# Patient Record
Sex: MALE | Race: WHITE | NOT HISPANIC OR LATINO | Employment: OTHER | URBAN - METROPOLITAN AREA
[De-identification: names, ages, dates, MRNs, and addresses within clinical notes are randomized per-mention and may not be internally consistent; named-entity substitution may affect disease eponyms.]

---

## 2017-11-21 ENCOUNTER — TRANSCRIBE ORDERS (OUTPATIENT)
Dept: ADMINISTRATIVE | Facility: HOSPITAL | Age: 57
End: 2017-11-21

## 2017-11-21 DIAGNOSIS — R10.2 PELVIC PAIN: Primary | ICD-10-CM

## 2017-11-29 ENCOUNTER — HOSPITAL ENCOUNTER (OUTPATIENT)
Dept: MRI IMAGING | Facility: HOSPITAL | Age: 57
Discharge: HOME | End: 2017-11-29
Attending: SPECIALIST | Admitting: SPECIALIST

## 2017-11-29 DIAGNOSIS — R10.2 PELVIC PAIN: ICD-10-CM

## 2017-11-29 PROCEDURE — 72195 MRI PELVIS W/O DYE: CPT

## 2017-12-04 ENCOUNTER — HOSPITAL ENCOUNTER (OUTPATIENT)
Facility: HOSPITAL | Age: 57
Setting detail: OBSERVATION
Discharge: HOME | End: 2017-12-07
Attending: EMERGENCY MEDICINE | Admitting: HOSPITALIST

## 2017-12-04 DIAGNOSIS — I82.491 ACUTE DEEP VEIN THROMBOSIS (DVT) OF OTHER SPECIFIED VEIN OF RIGHT LOWER EXTREMITY (HCC): Primary | ICD-10-CM

## 2017-12-04 PROBLEM — I82.409 DEEP VENOUS THROMBOSIS (HCC): Status: ACTIVE | Noted: 2017-12-04

## 2017-12-04 LAB
ALBUMIN SERPL-MCNC: 4.6 G/DL (ref 3.5–5.2)
ALBUMIN/GLOB SERPL: 1.4 G/DL
ALP SERPL-CCNC: 67 U/L (ref 39–117)
ALT SERPL W P-5'-P-CCNC: 22 U/L (ref 1–41)
ANION GAP SERPL CALCULATED.3IONS-SCNC: 12.7 MMOL/L
APTT PPP: 28.7 SECONDS (ref 22.7–35.4)
AST SERPL-CCNC: 20 U/L (ref 1–40)
BASOPHILS # BLD AUTO: 0.05 10*3/MM3 (ref 0–0.2)
BASOPHILS NFR BLD AUTO: 0.4 % (ref 0–1.5)
BILIRUB SERPL-MCNC: 0.5 MG/DL (ref 0.1–1.2)
BUN BLD-MCNC: 8 MG/DL (ref 6–20)
BUN/CREAT SERPL: 13.1 (ref 7–25)
CALCIUM SPEC-SCNC: 9.9 MG/DL (ref 8.6–10.5)
CHLORIDE SERPL-SCNC: 97 MMOL/L (ref 98–107)
CO2 SERPL-SCNC: 28.3 MMOL/L (ref 22–29)
CREAT BLD-MCNC: 0.61 MG/DL (ref 0.76–1.27)
DEPRECATED RDW RBC AUTO: 41.9 FL (ref 37–54)
EOSINOPHIL # BLD AUTO: 0.31 10*3/MM3 (ref 0–0.7)
EOSINOPHIL NFR BLD AUTO: 2.5 % (ref 0.3–6.2)
ERYTHROCYTE [DISTWIDTH] IN BLOOD BY AUTOMATED COUNT: 12.4 % (ref 11.5–14.5)
GFR SERPL CREATININE-BSD FRML MDRD: 137 ML/MIN/1.73
GLOBULIN UR ELPH-MCNC: 3.2 GM/DL
GLUCOSE BLD-MCNC: 95 MG/DL (ref 65–99)
HCT VFR BLD AUTO: 43.6 % (ref 40.4–52.2)
HGB BLD-MCNC: 14.9 G/DL (ref 13.7–17.6)
IMM GRANULOCYTES # BLD: 0.04 10*3/MM3 (ref 0–0.03)
IMM GRANULOCYTES NFR BLD: 0.3 % (ref 0–0.5)
INR PPP: 1.06 (ref 0.9–1.1)
LYMPHOCYTES # BLD AUTO: 2.08 10*3/MM3 (ref 0.9–4.8)
LYMPHOCYTES NFR BLD AUTO: 16.8 % (ref 19.6–45.3)
MCH RBC QN AUTO: 31.4 PG (ref 27–32.7)
MCHC RBC AUTO-ENTMCNC: 34.2 G/DL (ref 32.6–36.4)
MCV RBC AUTO: 92 FL (ref 79.8–96.2)
MONOCYTES # BLD AUTO: 0.78 10*3/MM3 (ref 0.2–1.2)
MONOCYTES NFR BLD AUTO: 6.3 % (ref 5–12)
NEUTROPHILS # BLD AUTO: 9.12 10*3/MM3 (ref 1.9–8.1)
NEUTROPHILS NFR BLD AUTO: 73.7 % (ref 42.7–76)
PLATELET # BLD AUTO: 199 10*3/MM3 (ref 140–500)
PMV BLD AUTO: 10.5 FL (ref 6–12)
POTASSIUM BLD-SCNC: 4.2 MMOL/L (ref 3.5–5.2)
PROT SERPL-MCNC: 7.8 G/DL (ref 6–8.5)
PROTHROMBIN TIME: 13.4 SECONDS (ref 11.7–14.2)
RBC # BLD AUTO: 4.74 10*6/MM3 (ref 4.6–6)
SODIUM BLD-SCNC: 138 MMOL/L (ref 136–145)
WBC NRBC COR # BLD: 12.38 10*3/MM3 (ref 4.5–10.7)

## 2017-12-04 PROCEDURE — 96365 THER/PROPH/DIAG IV INF INIT: CPT

## 2017-12-04 PROCEDURE — 36415 COLL VENOUS BLD VENIPUNCTURE: CPT

## 2017-12-04 PROCEDURE — G0378 HOSPITAL OBSERVATION PER HR: HCPCS

## 2017-12-04 PROCEDURE — 80053 COMPREHEN METABOLIC PANEL: CPT | Performed by: PHYSICIAN ASSISTANT

## 2017-12-04 PROCEDURE — 99284 EMERGENCY DEPT VISIT MOD MDM: CPT

## 2017-12-04 PROCEDURE — 96376 TX/PRO/DX INJ SAME DRUG ADON: CPT

## 2017-12-04 PROCEDURE — 85025 COMPLETE CBC W/AUTO DIFF WBC: CPT | Performed by: PHYSICIAN ASSISTANT

## 2017-12-04 PROCEDURE — 85730 THROMBOPLASTIN TIME PARTIAL: CPT | Performed by: PHYSICIAN ASSISTANT

## 2017-12-04 PROCEDURE — 25010000002 HEPARIN (PORCINE) PER 1000 UNITS: Performed by: PHYSICIAN ASSISTANT

## 2017-12-04 PROCEDURE — 85610 PROTHROMBIN TIME: CPT | Performed by: PHYSICIAN ASSISTANT

## 2017-12-04 PROCEDURE — 96366 THER/PROPH/DIAG IV INF ADDON: CPT

## 2017-12-04 RX ORDER — HEPARIN SODIUM 5000 [USP'U]/ML
40-80 INJECTION, SOLUTION INTRAVENOUS; SUBCUTANEOUS EVERY 6 HOURS PRN
Status: DISCONTINUED | OUTPATIENT
Start: 2017-12-04 | End: 2017-12-05

## 2017-12-04 RX ORDER — HEPARIN SODIUM 5000 [USP'U]/ML
80 INJECTION, SOLUTION INTRAVENOUS; SUBCUTANEOUS ONCE
Status: COMPLETED | OUTPATIENT
Start: 2017-12-04 | End: 2017-12-04

## 2017-12-04 RX ORDER — CALCIUM POLYCARBOPHIL 625 MG 625 MG/1
625 TABLET ORAL DAILY
Status: ON HOLD | COMMUNITY
End: 2017-12-05

## 2017-12-04 RX ORDER — ASPIRIN 81 MG/1
81 TABLET ORAL DAILY
COMMUNITY
End: 2017-12-07 | Stop reason: HOSPADM

## 2017-12-04 RX ORDER — CALCIUM POLYCARBOPHIL 625 MG 625 MG/1
625 TABLET ORAL DAILY
Status: DISCONTINUED | OUTPATIENT
Start: 2017-12-05 | End: 2017-12-07 | Stop reason: HOSPADM

## 2017-12-04 RX ORDER — PANTOPRAZOLE SODIUM 40 MG/1
40 TABLET, DELAYED RELEASE ORAL
Status: DISCONTINUED | OUTPATIENT
Start: 2017-12-05 | End: 2017-12-07 | Stop reason: HOSPADM

## 2017-12-04 RX ORDER — NIACIN 1000 MG/1
1000 TABLET, EXTENDED RELEASE ORAL NIGHTLY
COMMUNITY
End: 2017-12-04 | Stop reason: SDUPTHER

## 2017-12-04 RX ORDER — LISINOPRIL 20 MG/1
20 TABLET ORAL
Status: DISCONTINUED | OUTPATIENT
Start: 2017-12-05 | End: 2017-12-07 | Stop reason: HOSPADM

## 2017-12-04 RX ORDER — LISINOPRIL AND HYDROCHLOROTHIAZIDE 20; 12.5 MG/1; MG/1
1 TABLET ORAL DAILY
Status: ON HOLD | COMMUNITY
End: 2017-12-05

## 2017-12-04 RX ORDER — HYDROCHLOROTHIAZIDE 25 MG/1
12.5 TABLET ORAL DAILY
Status: DISCONTINUED | OUTPATIENT
Start: 2017-12-05 | End: 2017-12-05

## 2017-12-04 RX ORDER — NIACIN 500 MG/1
1000 TABLET, EXTENDED RELEASE ORAL DAILY
Status: ON HOLD | COMMUNITY
End: 2017-12-05

## 2017-12-04 RX ORDER — CHLORAL HYDRATE 500 MG
1000 CAPSULE ORAL
Status: ON HOLD | COMMUNITY
End: 2017-12-05

## 2017-12-04 RX ORDER — EZETIMIBE 10 MG/1
10 TABLET ORAL DAILY
Status: ON HOLD | COMMUNITY
End: 2017-12-05

## 2017-12-04 RX ADMIN — HEPARIN SODIUM 7800 UNITS: 5000 INJECTION, SOLUTION INTRAVENOUS; SUBCUTANEOUS at 18:17

## 2017-12-04 RX ADMIN — HEPARIN SODIUM 1500 UNITS/HR: 10000 INJECTION, SOLUTION INTRAVENOUS at 18:20

## 2017-12-04 NOTE — ED NOTES
Patient has known blood clot to right femoral, popliteal and posterior tibial veins. He has a doppler at [facility] today and was sent straight over to the ED. Patient has palpable pulse to the right foot with cap refill less than 3 ans foot feels cool to touch.      Rashida Hernandez RN  12/04/17 1544       Rashida Hernandez RN  12/04/17 1542

## 2017-12-04 NOTE — ED PROVIDER NOTES
Pt presents to ED complaining of right calf pain. He has had a known hamstring tear for 2 weeks, but this pain is acutely different. An outpatient doppler done earlier today showed a DVT, and pt was sent here to be admitted. Pt complains of pain, swelling, and bruising in his right calf. He denies chest pain, SOA, or any other sx at this time. Pt has never had a blood clot before. On my exam, there is old bruising in right calf, just superior to popliteal area.  NVI. No motor or sensory changes.    1740: Spoke with Dr. Yepez, who requests that I admit pt to [facility]. Pt will be started on Heparin drip.     I supervised care provided by the midlevel provider.    We have discussed this patient's history, physical exam, and treatment plan.   I have reviewed the note and personally saw and examined the patient and agree with the plan of care.    Documentation assistance provided by jonathan Vaughan for Tucker Rodrigues.  Information recorded by the scribe was done at my direction and has been verified and validated by me.       Mahi Vaughan  12/04/17 4767       Tucker Rodrigues MD  12/04/17 0703

## 2017-12-04 NOTE — ED PROVIDER NOTES
"EMERGENCY DEPARTMENT ENCOUNTER    CHIEF COMPLAINT  Chief Complaint: R calf pain and swelling  History given by:Pt  History limited by:Nothing  Room Number: 11/11  PMD: Nima Morton MD      HPI:  Pt is a 57 y.o. male who presents with R calf pain and swelling onset a few days ago. Pt reports he was sleeping on a tarp a week ago and heard something \"tear.\" Pt reports he waited 4-5 days after sustaining the injury to his R leg to go see Dr. Yepez (orthopedist). The day before he saw his orthopedist, he developed pain and swelling to his R calf. When he saw Dr. Yepez, he was informed he tore his hamstring. Pt also reports that when he was seen by Dr. Yepez, they recommended he have an US to rule out a DVT. He reports going to [facility] for an US today who then told him he had a DVT. PT denies any current CP or SOA.     Duration: a few days  Timing:constant  Location:RLE  Radiation:none  Quality:pain and swelling  Intensity/Severity:moderate  Progression:worsening  Associated Symptoms:none stated  Aggravating Factors:none  Alleviating Factors:none  Previous Episodes:Pt did not state a prior hx of blood clots  Treatment before arrival:Pt was seen at [facility] today for an US, and was told he had a DVT.     MEDICAL RECORD REVIEW  Pt has a hx of HTN. He does not have any listed history in [place] because he is from [place]. Pt had a Doppler US at [facility] today and was found to have R femoral, popliteal, and posterior tibial DVT. He was sent here to be admitted.       PAST MEDICAL HISTORY  Active Ambulatory Problems     Diagnosis Date Noted   • No Active Ambulatory Problems     Resolved Ambulatory Problems     Diagnosis Date Noted   • No Resolved Ambulatory Problems     Past Medical History:   Diagnosis Date   • DVT (deep venous thrombosis)    • Heart murmur    • Hyperlipidemia    • Hypertension        PAST SURGICAL HISTORY  Past Surgical History:   Procedure Laterality Date   • ADENOIDECTOMY     • KNEE " SURGERY     • SHOULDER SURGERY     • TONSILLECTOMY         FAMILY HISTORY  History reviewed. No pertinent family history.    SOCIAL HISTORY  Social History     Social History   • Marital status:      Spouse name: N/A   • Number of children: N/A   • Years of education: N/A     Occupational History   • Not on file.     Social History Main Topics   • Smoking status: Never Smoker   • Smokeless tobacco: Never Used   • Alcohol use Yes      Comment: socially   • Drug use: No   • Sexual activity: Defer     Other Topics Concern   • Not on file     Social History Narrative   • No narrative on file       ALLERGIES  Review of patient's allergies indicates no known allergies.    REVIEW OF SYSTEMS  Review of Systems   Constitutional: Negative for activity change, appetite change and fever.   HENT: Negative for congestion and sore throat.    Eyes: Negative.    Respiratory: Negative for cough and shortness of breath.    Cardiovascular: Negative for chest pain.   Gastrointestinal: Negative for abdominal pain, diarrhea and vomiting.   Endocrine: Negative.    Genitourinary: Negative for decreased urine volume and dysuria.   Musculoskeletal: Positive for myalgias (R calf). Negative for neck pain.   Skin: Negative for rash and wound.   Allergic/Immunologic: Negative.    Neurological: Negative for weakness, numbness and headaches.   Hematological: Negative.    Psychiatric/Behavioral: Negative.    All other systems reviewed and are negative.      PHYSICAL EXAM  ED Triage Vitals   Temp Heart Rate Resp BP SpO2   12/04/17 1522 12/04/17 1522 12/04/17 1522 12/04/17 1546 12/04/17 1522   99 °F (37.2 °C) 84 18 143/95 99 %      Temp src Heart Rate Source Patient Position BP Location FiO2 (%)   -- -- -- -- --              Physical Exam   Constitutional: He is oriented to person, place, and time and well-developed, well-nourished, and in no distress. No distress.   HENT:   Head: Normocephalic and atraumatic.   Mouth/Throat: Oropharynx is  clear and moist.   Eyes: EOM are normal. Pupils are equal, round, and reactive to light.   Neck: Normal range of motion. Neck supple.   Cardiovascular: Normal rate, regular rhythm and normal heart sounds.    Pulses:       Dorsalis pedis pulses are 2+ on the right side, and 2+ on the left side.        Posterior tibial pulses are 2+ on the right side, and 2+ on the left side.   Pulmonary/Chest: Effort normal and breath sounds normal. No respiratory distress. He has no wheezes. He exhibits no tenderness.   Abdominal: Soft. He exhibits no distension. There is no tenderness. There is no rebound and no guarding.   Musculoskeletal:        Right lower leg: He exhibits tenderness (medial calf).   Lymphadenopathy:     He has no cervical adenopathy.   Neurological: He is alert and oriented to person, place, and time.   Skin: Skin is warm and dry. No rash noted. No pallor.   There is no obvious swelling, erythema or warmth to the medial calf. There is yellow to purple bruising on the R distal medial thigh that tracks up through his thigh.    Psychiatric: Mood, memory, affect and judgment normal.   Nursing note and vitals reviewed.      LAB RESULTS  Recent Results (from the past 24 hour(s))   Protime-INR    Collection Time: 12/04/17  5:42 PM   Result Value Ref Range    Protime 13.4 11.7 - 14.2 Seconds    INR 1.06 0.90 - 1.10   aPTT    Collection Time: 12/04/17  5:42 PM   Result Value Ref Range    PTT 28.7 22.7 - 35.4 seconds   CBC Auto Differential    Collection Time: 12/04/17  5:42 PM   Result Value Ref Range    WBC 12.38 (H) 4.50 - 10.70 10*3/mm3    RBC 4.74 4.60 - 6.00 10*6/mm3    Hemoglobin 14.9 13.7 - 17.6 g/dL    Hematocrit 43.6 40.4 - 52.2 %    MCV 92.0 79.8 - 96.2 fL    MCH 31.4 27.0 - 32.7 pg    MCHC 34.2 32.6 - 36.4 g/dL    RDW 12.4 11.5 - 14.5 %    RDW-SD 41.9 37.0 - 54.0 fl    MPV 10.5 6.0 - 12.0 fL    Platelets 199 140 - 500 10*3/mm3    Neutrophil % 73.7 42.7 - 76.0 %    Lymphocyte % 16.8 (L) 19.6 - 45.3 %     Monocyte % 6.3 5.0 - 12.0 %    Eosinophil % 2.5 0.3 - 6.2 %    Basophil % 0.4 0.0 - 1.5 %    Immature Grans % 0.3 0.0 - 0.5 %    Neutrophils, Absolute 9.12 (H) 1.90 - 8.10 10*3/mm3    Lymphocytes, Absolute 2.08 0.90 - 4.80 10*3/mm3    Monocytes, Absolute 0.78 0.20 - 1.20 10*3/mm3    Eosinophils, Absolute 0.31 0.00 - 0.70 10*3/mm3    Basophils, Absolute 0.05 0.00 - 0.20 10*3/mm3    Immature Grans, Absolute 0.04 (H) 0.00 - 0.03 10*3/mm3       I ordered the above labs and reviewed the results    COURSE & MEDICAL DECISION MAKING  Pertinent Labs and Imaging studies that were ordered and reviewed are noted above.  Results were reviewed/discussed with the patient and they were also made aware of online assess.  Pt also made aware that some labs, such as cultures, will not be resulted during ER visit and follow up with PMD is necessary.       PROGRESS AND CONSULTS    Progress Notes:    ED Course     1718  Reviewed pt's history and workup with Dr. Rodrigues.  After a bedside evaluation; Dr Rodrigues agrees with the plan of care    1740  Dr. Rodrigues consulted with Dr. Yepez (orthopedist) who states she would like the pt to admitted, and recommends he be heparinized.     1741  Consulted with Zulma (Pharmacy) who reports she will initiate heparin protocol. Consult placed with GERDA.     1803  Consulted with Dr. Brito ([facility]) who agrees to admit the pt    1804  Based on the patient's lab findings and presenting symptoms, the doctor and I feel it is appropriate to admit the patient for further management, evaluation, and treatment.  I have discussed this with the admitting team.  I have also discussed this with the patient/family.  They are in agreement with admission.        MEDICATIONS GIVEN IN ER  Medications   heparin (porcine) 5000 UNIT/ML injection 7,800 Units (not administered)   heparin 34919 units/250 mL (100 units/mL) in 0.45 % NaCl infusion (not administered)   heparin (porcine) 5000 UNIT/ML injection 3,900-7,800  "Units (not administered)       /91  Pulse 78  Temp 99 °F (37.2 °C)  Resp 18  Ht 67\" (170.2 cm)  Wt 215 lb (97.5 kg)  SpO2 97%  BMI 33.67 kg/m2      DIAGNOSIS  Final diagnoses:   Acute deep vein thrombosis (DVT) of other specified vein of right lower extremity       Documentation assistance provided by jonathan Baum for Denise Mills PA-C.  Information recorded by the scribe was done at my direction and has been verified and validated by me.  Electronically signed by Ismael Baum on 12/4/2017 at time 6:13 PM           Ismael Baum  12/04/17 1812       Denise Mills PA-C  12/04/17 1813    "

## 2017-12-04 NOTE — PROGRESS NOTES
Clinical Pharmacy Services: Medication History    Henry Campos is a 57 y.o. male presenting to [facility] for   Chief Complaint   Patient presents with   • Leg Swelling     right reg DVT diagnosed outpatient, sent to be admitted. Hx torn hamstring.  swelling, pain, and bruising.        He  has a past medical history of DVT (deep venous thrombosis); Heart murmur; Hyperlipidemia; and Hypertension.    Allergies as of 12/04/2017   • (No Known Allergies)       Medication information was obtained from: Patient, spouse  Pharmacy and Phone Number: [facility] [phone]    Prior to Admission Medications     Prescriptions Last Dose Informant Patient Reported? Taking?    aspirin 81 MG EC tablet 12/3/2017 Spouse/Significant Other Yes Yes    Take 81 mg by mouth Daily.    calcium polycarbophil (FIBERCON) 625 MG tablet 12/3/2017 Spouse/Significant Other Yes Yes    Take 625 mg by mouth Daily.    ezetimibe (ZETIA) 10 MG tablet 12/3/2017 Spouse/Significant Other Yes Yes    Take 10 mg by mouth Daily.    lisinopril-hydrochlorothiazide (PRINZIDE,ZESTORETIC) 20-12.5 MG per tablet 12/3/2017 Spouse/Significant Other Yes Yes    Take 1 tablet by mouth Daily.    niacin (NIASPAN) 500 MG CR tablet 12/3/2017 Spouse/Significant Other Yes Yes    Take 1,000 mg by mouth Daily.    Omega-3 Fatty Acids (FISH OIL) 1000 MG capsule capsule 12/3/2017 Spouse/Significant Other Yes Yes    Take 1,000 mg by mouth Daily With Breakfast.            Medication notes: None    This medication list is complete to the best of my knowledge as of 12/4/2017    Please call if questions.    Eboni Adkins, Medication History Technician  12/4/2017 6:03 PM

## 2017-12-05 LAB
ALBUMIN SERPL-MCNC: 4.3 G/DL (ref 3.5–5.2)
ALBUMIN/GLOB SERPL: 1.5 G/DL
ALP SERPL-CCNC: 66 U/L (ref 39–117)
ALT SERPL W P-5'-P-CCNC: 17 U/L (ref 1–41)
ANION GAP SERPL CALCULATED.3IONS-SCNC: 11.8 MMOL/L
APTT PPP: 91 SECONDS (ref 22.7–35.4)
APTT PPP: 94.5 SECONDS (ref 22.7–35.4)
AST SERPL-CCNC: 15 U/L (ref 1–40)
BASOPHILS # BLD AUTO: 0.06 10*3/MM3 (ref 0–0.2)
BASOPHILS NFR BLD AUTO: 0.6 % (ref 0–1.5)
BILIRUB SERPL-MCNC: 0.6 MG/DL (ref 0.1–1.2)
BUN BLD-MCNC: 10 MG/DL (ref 6–20)
BUN/CREAT SERPL: 14.7 (ref 7–25)
CALCIUM SPEC-SCNC: 9.3 MG/DL (ref 8.6–10.5)
CHLORIDE SERPL-SCNC: 98 MMOL/L (ref 98–107)
CO2 SERPL-SCNC: 28.2 MMOL/L (ref 22–29)
CREAT BLD-MCNC: 0.68 MG/DL (ref 0.76–1.27)
DEPRECATED RDW RBC AUTO: 42.1 FL (ref 37–54)
EOSINOPHIL # BLD AUTO: 0.41 10*3/MM3 (ref 0–0.7)
EOSINOPHIL NFR BLD AUTO: 4.1 % (ref 0.3–6.2)
ERYTHROCYTE [DISTWIDTH] IN BLOOD BY AUTOMATED COUNT: 12.4 % (ref 11.5–14.5)
GFR SERPL CREATININE-BSD FRML MDRD: 121 ML/MIN/1.73
GLOBULIN UR ELPH-MCNC: 2.9 GM/DL
GLUCOSE BLD-MCNC: 95 MG/DL (ref 65–99)
HCT VFR BLD AUTO: 41.1 % (ref 40.4–52.2)
HGB BLD-MCNC: 13.9 G/DL (ref 13.7–17.6)
IMM GRANULOCYTES # BLD: 0.02 10*3/MM3 (ref 0–0.03)
IMM GRANULOCYTES NFR BLD: 0.2 % (ref 0–0.5)
LYMPHOCYTES # BLD AUTO: 2.52 10*3/MM3 (ref 0.9–4.8)
LYMPHOCYTES NFR BLD AUTO: 25.1 % (ref 19.6–45.3)
MCH RBC QN AUTO: 31.4 PG (ref 27–32.7)
MCHC RBC AUTO-ENTMCNC: 33.8 G/DL (ref 32.6–36.4)
MCV RBC AUTO: 92.8 FL (ref 79.8–96.2)
MONOCYTES # BLD AUTO: 0.67 10*3/MM3 (ref 0.2–1.2)
MONOCYTES NFR BLD AUTO: 6.7 % (ref 5–12)
NEUTROPHILS # BLD AUTO: 6.34 10*3/MM3 (ref 1.9–8.1)
NEUTROPHILS NFR BLD AUTO: 63.3 % (ref 42.7–76)
PLATELET # BLD AUTO: 204 10*3/MM3 (ref 140–500)
PMV BLD AUTO: 10.5 FL (ref 6–12)
POTASSIUM BLD-SCNC: 4.1 MMOL/L (ref 3.5–5.2)
PROT SERPL-MCNC: 7.2 G/DL (ref 6–8.5)
RBC # BLD AUTO: 4.43 10*6/MM3 (ref 4.6–6)
SODIUM BLD-SCNC: 138 MMOL/L (ref 136–145)
WBC NRBC COR # BLD: 10.02 10*3/MM3 (ref 4.5–10.7)

## 2017-12-05 PROCEDURE — 80053 COMPREHEN METABOLIC PANEL: CPT | Performed by: HOSPITALIST

## 2017-12-05 PROCEDURE — 36415 COLL VENOUS BLD VENIPUNCTURE: CPT | Performed by: PHYSICIAN ASSISTANT

## 2017-12-05 PROCEDURE — 85730 THROMBOPLASTIN TIME PARTIAL: CPT | Performed by: PHYSICIAN ASSISTANT

## 2017-12-05 PROCEDURE — 96372 THER/PROPH/DIAG INJ SC/IM: CPT

## 2017-12-05 PROCEDURE — G0378 HOSPITAL OBSERVATION PER HR: HCPCS

## 2017-12-05 PROCEDURE — 85730 THROMBOPLASTIN TIME PARTIAL: CPT | Performed by: EMERGENCY MEDICINE

## 2017-12-05 PROCEDURE — 25010000002 ENOXAPARIN PER 10 MG: Performed by: HOSPITALIST

## 2017-12-05 PROCEDURE — 25010000002 HEPARIN (PORCINE) PER 1000 UNITS: Performed by: PHYSICIAN ASSISTANT

## 2017-12-05 PROCEDURE — 85025 COMPLETE CBC W/AUTO DIFF WBC: CPT | Performed by: PHYSICIAN ASSISTANT

## 2017-12-05 PROCEDURE — 96366 THER/PROPH/DIAG IV INF ADDON: CPT

## 2017-12-05 RX ORDER — HEPARIN SODIUM 5000 [USP'U]/ML
80 INJECTION, SOLUTION INTRAVENOUS; SUBCUTANEOUS ONCE
Status: DISCONTINUED | OUTPATIENT
Start: 2017-12-05 | End: 2017-12-05

## 2017-12-05 RX ORDER — ASPIRIN 81 MG/1
81 TABLET ORAL DAILY
Status: DISCONTINUED | OUTPATIENT
Start: 2017-12-05 | End: 2017-12-06

## 2017-12-05 RX ADMIN — ENOXAPARIN SODIUM 100 MG: 100 INJECTION SUBCUTANEOUS at 15:22

## 2017-12-05 RX ADMIN — CALCIUM POLYCARBOPHIL 625 MG: 625 TABLET, FILM COATED ORAL at 08:16

## 2017-12-05 RX ADMIN — ASPIRIN 81 MG: 81 TABLET ORAL at 15:21

## 2017-12-05 RX ADMIN — LISINOPRIL 20 MG: 20 TABLET ORAL at 08:15

## 2017-12-05 RX ADMIN — HEPARIN SODIUM 1500 UNITS/HR: 10000 INJECTION, SOLUTION INTRAVENOUS at 12:14

## 2017-12-05 RX ADMIN — HYDROCHLOROTHIAZIDE 12.5 MG: 25 TABLET ORAL at 08:15

## 2017-12-05 NOTE — PROGRESS NOTES
Pharmacy consult for Lovenox dosing    Henry Campos is a 57 y.o. male 97.5 kg (215 lb).    Pharmacy consulted to dose per Dr. Brito  Indication: RLE DVT    Active Inpatient Anticoagulation Orders: Previously on Heparin drip, now Enoxaparin 1mg/kg q12h    Estimated Creatinine Clearance: 135 mL/min (by C-G formula based on Cr of 0.68).  Body mass index is 33.67 kg/(m^2).    Creatinine   Date Value Ref Range Status   12/05/2017 0.68 (L) 0.76 - 1.27 mg/dL Final   12/04/2017 0.61 (L) 0.76 - 1.27 mg/dL Final     Platelets   Date Value Ref Range Status   12/05/2017 204 140 - 500 10*3/mm3 Final   12/04/2017 199 140 - 500 10*3/mm3 Final     Hemoglobin   Date Value Ref Range Status   12/05/2017 13.9 13.7 - 17.6 g/dL Final   12/04/2017 14.9 13.7 - 17.6 g/dL Final     Lab Results   Component Value Date    INR 1.06 12/04/2017     PLAN:  As estimated CrCl > 30 mL/min at this time, will start Lovenox at 1mg/kg q12h (100mg).  Pharmacy will continue to follow and adjust as needed.      Thanks, Henry Jeong, PharmD, BCPS

## 2017-12-05 NOTE — PLAN OF CARE
Problem: Patient Care Overview (Adult)  Goal: Plan of Care Review  Outcome: Ongoing (interventions implemented as appropriate)    12/05/17 0318   Coping/Psychosocial Response Interventions   Plan Of Care Reviewed With patient   Patient Care Overview   Progress no change   Outcome Evaluation   Outcome Summary/Follow up Plan Patient is a new admit to unit. Heparin drip maintained per protocol. Patient up ad rosamaria with assistance with crutches. Wife at bedside; patient rested well throughout the night. Will continue to monitor.          Problem: Fall Risk (Adult)  Goal: Identify Related Risk Factors and Signs and Symptoms  Outcome: Ongoing (interventions implemented as appropriate)  Goal: Absence of Falls  Outcome: Ongoing (interventions implemented as appropriate)    Problem: Pain, Acute (Adult)  Goal: Identify Related Risk Factors and Signs and Symptoms  Outcome: Ongoing (interventions implemented as appropriate)  Goal: Acceptable Pain Control/Comfort Level  Outcome: Ongoing (interventions implemented as appropriate)

## 2017-12-05 NOTE — H&P
"History and physical    Primary care physician  Dr. DE JESUS    Chief complaint  Right calf pain and swelling    History of present illness  Pt is a 57 y.o. male who presents with R calf pain and swelling onset a few days ago. Pt reports he was sleeping on a tarp a week ago and heard something \"tear.\" Pt reports he waited 4-5 days after sustaining the injury to his R leg to go see Dr. Yepez (orthopedist). The day before he saw his orthopedist, he developed pain and swelling to his R calf. When he saw Dr. Yepez, he was informed he tore his hamstring. Pt also reports that when he was seen by Dr. Yepez, they recommended he have an US to rule out a DVT. He reports going to [facility] for an US today who then told him he had a DVT. PT denies any current CP or SOA.  Patient denies any chest pain shortness of breath abdominal pain nausea vomiting diarrhea.  Patient still hurting at the right calf at the time of interview she also denies any fevers chills night sweats or weight loss    PAST MEDICAL HISTORY    • DVT (deep venous thrombosis)     • Heart murmur     • Hyperlipidemia     • Hypertension           PAST SURGICAL HISTORY   Surgical History          Past Surgical History:   Procedure Laterality Date   • ADENOIDECTOMY       • KNEE SURGERY       • SHOULDER SURGERY       • TONSILLECTOMY                FAMILY HISTORY  History reviewed. No pertinent family history.     SOCIAL HISTORY   Social History    Social History            Social History   • Marital status:        Spouse name: N/A   • Number of children: N/A   • Years of education: N/A          Occupational History   • Not on file.              Social History Main Topics    • Smoking status: Never Smoker    • Smokeless tobacco: Never Used    • Alcohol use Yes         Comment: socially    • Drug use: No    • Sexual activity: Defer            Other Topics Concern   • Not on file          Social History Narrative   • No narrative on file    " "        ALLERGIES  Review of patient's allergies indicates no known allergies.    Home medications reviewed     REVIEW OF SYSTEMS  Review of Systems   Constitutional: Negative for activity change, appetite change and fever.   HENT: Negative for congestion and sore throat.    Eyes: Negative.    Respiratory: Negative for cough and shortness of breath.    Cardiovascular: Negative for chest pain.   Gastrointestinal: Negative for abdominal pain, diarrhea and vomiting.   Endocrine: Negative.    Genitourinary: Negative for decreased urine volume and dysuria.   Musculoskeletal: Positive for myalgias (R calf). Negative for neck pain.   Skin: Negative for rash and wound.   Allergic/Immunologic: Negative.    Neurological: Negative for weakness, numbness and headaches.   Hematological: Negative.    Psychiatric/Behavioral: Negative.    All other systems reviewed and are negative.     PHYSICAL EXAM  Blood pressure 127/76, pulse 79, temperature 97.9 °F (36.6 °C), temperature source Oral, resp. rate 18, height 170.2 cm (67\"), weight 97.5 kg (215 lb), SpO2 95 %.    Constitutional: He is oriented to person, place, and time and well-developed, well-nourished, and in no distress. No distress.  Head: Normocephalic and atraumatic.   Mouth/Throat: Oropharynx is clear and moist.   Eyes: EOM are normal. Pupils are equal, round, and reactive to light.   Neck: Normal range of motion. Neck supple.   Cardiovascular: Normal rate, regular rhythm and normal heart sounds.    Pulses:       Dorsalis pedis pulses are 2+ on the right side, and 2+ on the left side.        Posterior tibial pulses are 2+ on the right side, and 2+ on the left side.   Pulmonary/Chest: Effort normal and breath sounds normal. No respiratory distress. He has no wheezes. He exhibits no tenderness.   Abdominal: Soft. He exhibits no distension. There is no tenderness. There is no rebound and no guarding.   Musculoskeletal:        Right lower leg: He exhibits tenderness (medial " calf).   Lymphadenopathy:     He has no cervical adenopathy.   Neurological: He is alert and oriented to person, place, and time.   Skin: Skin is warm and dry. No rash noted. No pallor.   There is no obvious swelling, erythema or warmth to the medial calf. There is yellow to purple bruising on the R distal medial thigh that tracks up through his thigh.    Psychiatric: Mood, memory, affect and judgment normal.     LAB RESULTS  Lab Results (last 24 hours)     Procedure Component Value Units Date/Time    CBC & Differential [[id number]] Collected:  12/04/17 1742    Specimen:  Blood Updated:  12/04/17 1753    Narrative:       The following orders were created for panel order CBC & Differential.  Procedure                               Abnormality         Status                     ---------                               -----------         ------                     CBC Auto Differential[[id number]]         Abnormal            Final result                 Please view results for these tests on the individual orders.    CBC Auto Differential [[id number]]  (Abnormal) Collected:  12/04/17 1742    Specimen:  Blood Updated:  12/04/17 1753     WBC 12.38 (H) 10*3/mm3      RBC 4.74 10*6/mm3      Hemoglobin 14.9 g/dL      Hematocrit 43.6 %      MCV 92.0 fL      MCH 31.4 pg      MCHC 34.2 g/dL      RDW 12.4 %      RDW-SD 41.9 fl      MPV 10.5 fL      Platelets 199 10*3/mm3      Neutrophil % 73.7 %      Lymphocyte % 16.8 (L) %      Monocyte % 6.3 %      Eosinophil % 2.5 %      Basophil % 0.4 %      Immature Grans % 0.3 %      Neutrophils, Absolute 9.12 (H) 10*3/mm3      Lymphocytes, Absolute 2.08 10*3/mm3      Monocytes, Absolute 0.78 10*3/mm3      Eosinophils, Absolute 0.31 10*3/mm3      Basophils, Absolute 0.05 10*3/mm3      Immature Grans, Absolute 0.04 (H) 10*3/mm3     Protime-INR [[id number]]  (Normal) Collected:  12/04/17 1742    Specimen:  Blood Updated:  12/04/17 1802     Protime 13.4 Seconds      INR 1.06    aPTT [[id number]]   (Normal) Collected:  12/04/17 1742    Specimen:  Blood Updated:  12/04/17 1802     PTT 28.7 seconds     Comprehensive Metabolic Panel [[id number]]  (Abnormal) Collected:  12/04/17 1830    Specimen:  Blood Updated:  12/04/17 1910     Glucose 95 mg/dL      BUN 8 mg/dL      Creatinine 0.61 (L) mg/dL      Sodium 138 mmol/L      Potassium 4.2 mmol/L      Chloride 97 (L) mmol/L      CO2 28.3 mmol/L      Calcium 9.9 mg/dL      Total Protein 7.8 g/dL      Albumin 4.60 g/dL      ALT (SGPT) 22 U/L      AST (SGOT) 20 U/L      Alkaline Phosphatase 67 U/L      Total Bilirubin 0.5 mg/dL      eGFR Non African Amer 137 mL/min/1.73      Globulin 3.2 gm/dL      A/G Ratio 1.4 g/dL      BUN/Creatinine Ratio 13.1     Anion Gap 12.7 mmol/L     aPTT [[id number]]  (Abnormal) Collected:  12/05/17 0021    Specimen:  Blood Updated:  12/05/17 0106     PTT 94.5 (H) seconds     CBC & Differential [[id number]] Collected:  12/05/17 0617    Specimen:  Blood Updated:  12/05/17 0703    Narrative:       The following orders were created for panel order CBC & Differential.  Procedure                               Abnormality         Status                     ---------                               -----------         ------                     CBC Auto Differential[[id number]]        Abnormal            Final result                 Please view results for these tests on the individual orders.    CBC Auto Differential [[id number]]  (Abnormal) Collected:  12/05/17 0617    Specimen:  Blood Updated:  12/05/17 0703     WBC 10.02 10*3/mm3      RBC 4.43 (L) 10*6/mm3      Hemoglobin 13.9 g/dL      Hematocrit 41.1 %      MCV 92.8 fL      MCH 31.4 pg      MCHC 33.8 g/dL      RDW 12.4 %      RDW-SD 42.1 fl      MPV 10.5 fL      Platelets 204 10*3/mm3      Neutrophil % 63.3 %      Lymphocyte % 25.1 %      Monocyte % 6.7 %      Eosinophil % 4.1 %      Basophil % 0.6 %      Immature Grans % 0.2 %      Neutrophils, Absolute 6.34 10*3/mm3      Lymphocytes, Absolute 2.52  10*3/mm3      Monocytes, Absolute 0.67 10*3/mm3      Eosinophils, Absolute 0.41 10*3/mm3      Basophils, Absolute 0.06 10*3/mm3      Immature Grans, Absolute 0.02 10*3/mm3     aPTT [[id number]]  (Abnormal) Collected:  12/05/17 0617    Specimen:  Blood Updated:  12/05/17 0711     PTT 91.0 (H) seconds     Comprehensive Metabolic Panel [[id number]]  (Abnormal) Collected:  12/05/17 0617    Specimen:  Blood Updated:  12/05/17 0725     Glucose 95 mg/dL      BUN 10 mg/dL      Creatinine 0.68 (L) mg/dL      Sodium 138 mmol/L      Potassium 4.1 mmol/L      Chloride 98 mmol/L      CO2 28.2 mmol/L      Calcium 9.3 mg/dL      Total Protein 7.2 g/dL      Albumin 4.30 g/dL      ALT (SGPT) 17 U/L      AST (SGOT) 15 U/L      Alkaline Phosphatase 66 U/L      Total Bilirubin 0.6 mg/dL      eGFR Non African Amer 121 mL/min/1.73      Globulin 2.9 gm/dL      A/G Ratio 1.5 g/dL      BUN/Creatinine Ratio 14.7     Anion Gap 11.8 mmol/L         Imaging Results (last 24 hours)     ** No results found for the last 24 hours. **          Current Facility-Administered Medications:   •  aspirin EC tablet 81 mg, 81 mg, Oral, Daily, Artur Brito MD  •  calcium polycarbophil (FIBERCON) tablet 625 mg, 625 mg, Oral, Daily, Artur Brito MD, 625 mg at 12/05/17 0816  •  heparin (porcine) 5000 UNIT/ML injection 3,900-7,800 Units, 40-80 Units/kg, Intravenous, Q6H PRN, Denise Mills PA-C  •  heparin 19440 units/250 mL (100 units/mL) in 0.45 % NaCl infusion, 1,500 Units/hr, Intravenous, Titrated, Denise Mills PA-C, Last Rate: 15 mL/hr at 12/05/17 1214, 1,500 Units/hr at 12/05/17 1214  •  lisinopril (PRINIVIL,ZESTRIL) tablet 20 mg, 20 mg, Oral, Q24H, Artur Brito MD, 20 mg at 12/05/17 0815  •  pantoprazole (PROTONIX) EC tablet 40 mg, 40 mg, Oral, Q AM, Artur MD Alisha     ASSESSMENT  Right lower extremity DVT  Right hamstring tear  Hypertension  Hyperlipidemia    PLAN  Admit  [facility]  Orthopedic surgery consult  Continue home medications  Stress  ulcer prophylaxis  Pain management  Follow closely further recommendation according to hospital course    MINE MCKEON MD

## 2017-12-05 NOTE — PLAN OF CARE
Problem: Patient Care Overview (Adult)  Goal: Plan of Care Review  Outcome: Ongoing (interventions implemented as appropriate)    12/05/17 3538   Coping/Psychosocial Response Interventions   Plan Of Care Reviewed With patient   Outcome Evaluation   Outcome Summary/Follow up Plan pt on heparin drip, stopped and transitioned to aspirin and lovenox, denies pain, vss, sr on monitor.       Goal: Adult Individualization and Mutuality  Outcome: Ongoing (interventions implemented as appropriate)  Goal: Discharge Needs Assessment  Outcome: Ongoing (interventions implemented as appropriate)    Problem: Fall Risk (Adult)  Goal: Identify Related Risk Factors and Signs and Symptoms  Outcome: Ongoing (interventions implemented as appropriate)  Goal: Absence of Falls  Outcome: Ongoing (interventions implemented as appropriate)    Problem: Pain, Acute (Adult)  Goal: Identify Related Risk Factors and Signs and Symptoms  Outcome: Ongoing (interventions implemented as appropriate)  Goal: Acceptable Pain Control/Comfort Level  Outcome: Ongoing (interventions implemented as appropriate)

## 2017-12-06 LAB
ALBUMIN SERPL-MCNC: 4.3 G/DL (ref 3.5–5.2)
ALBUMIN/GLOB SERPL: 1.5 G/DL
ALP SERPL-CCNC: 61 U/L (ref 39–117)
ALT SERPL W P-5'-P-CCNC: 15 U/L (ref 1–41)
ANION GAP SERPL CALCULATED.3IONS-SCNC: 8.8 MMOL/L
AST SERPL-CCNC: 11 U/L (ref 1–40)
BASOPHILS # BLD AUTO: 0.05 10*3/MM3 (ref 0–0.2)
BASOPHILS NFR BLD AUTO: 0.5 % (ref 0–1.5)
BILIRUB SERPL-MCNC: 0.6 MG/DL (ref 0.1–1.2)
BUN BLD-MCNC: 12 MG/DL (ref 6–20)
BUN/CREAT SERPL: 17.9 (ref 7–25)
CALCIUM SPEC-SCNC: 9 MG/DL (ref 8.6–10.5)
CHLORIDE SERPL-SCNC: 100 MMOL/L (ref 98–107)
CHOLEST SERPL-MCNC: 165 MG/DL (ref 0–200)
CO2 SERPL-SCNC: 29.2 MMOL/L (ref 22–29)
CREAT BLD-MCNC: 0.67 MG/DL (ref 0.76–1.27)
DEPRECATED RDW RBC AUTO: 41.7 FL (ref 37–54)
EOSINOPHIL # BLD AUTO: 0.48 10*3/MM3 (ref 0–0.7)
EOSINOPHIL NFR BLD AUTO: 5.1 % (ref 0.3–6.2)
ERYTHROCYTE [DISTWIDTH] IN BLOOD BY AUTOMATED COUNT: 12.3 % (ref 11.5–14.5)
GFR SERPL CREATININE-BSD FRML MDRD: 123 ML/MIN/1.73
GLOBULIN UR ELPH-MCNC: 2.9 GM/DL
GLUCOSE BLD-MCNC: 95 MG/DL (ref 65–99)
HBA1C MFR BLD: 5.6 % (ref 4.8–5.6)
HCT VFR BLD AUTO: 41.3 % (ref 40.4–52.2)
HDLC SERPL-MCNC: 38 MG/DL (ref 40–60)
HGB BLD-MCNC: 13.8 G/DL (ref 13.7–17.6)
IMM GRANULOCYTES # BLD: 0.02 10*3/MM3 (ref 0–0.03)
IMM GRANULOCYTES NFR BLD: 0.2 % (ref 0–0.5)
LDLC SERPL CALC-MCNC: 101 MG/DL (ref 0–100)
LDLC/HDLC SERPL: 2.65 {RATIO}
LYMPHOCYTES # BLD AUTO: 2.25 10*3/MM3 (ref 0.9–4.8)
LYMPHOCYTES NFR BLD AUTO: 24.1 % (ref 19.6–45.3)
MCH RBC QN AUTO: 31 PG (ref 27–32.7)
MCHC RBC AUTO-ENTMCNC: 33.4 G/DL (ref 32.6–36.4)
MCV RBC AUTO: 92.8 FL (ref 79.8–96.2)
MONOCYTES # BLD AUTO: 0.86 10*3/MM3 (ref 0.2–1.2)
MONOCYTES NFR BLD AUTO: 9.2 % (ref 5–12)
NEUTROPHILS # BLD AUTO: 5.67 10*3/MM3 (ref 1.9–8.1)
NEUTROPHILS NFR BLD AUTO: 60.9 % (ref 42.7–76)
NT-PROBNP SERPL-MCNC: <5 PG/ML (ref 5–900)
PLATELET # BLD AUTO: 192 10*3/MM3 (ref 140–500)
PMV BLD AUTO: 10.5 FL (ref 6–12)
POTASSIUM BLD-SCNC: 3.9 MMOL/L (ref 3.5–5.2)
PROT SERPL-MCNC: 7.2 G/DL (ref 6–8.5)
RBC # BLD AUTO: 4.45 10*6/MM3 (ref 4.6–6)
SODIUM BLD-SCNC: 138 MMOL/L (ref 136–145)
TRIGL SERPL-MCNC: 131 MG/DL (ref 0–150)
TSH SERPL DL<=0.05 MIU/L-ACNC: 2.63 MIU/ML (ref 0.27–4.2)
VLDLC SERPL-MCNC: 26.2 MG/DL (ref 5–40)
WBC NRBC COR # BLD: 9.33 10*3/MM3 (ref 4.5–10.7)

## 2017-12-06 PROCEDURE — G0378 HOSPITAL OBSERVATION PER HR: HCPCS

## 2017-12-06 PROCEDURE — 83036 HEMOGLOBIN GLYCOSYLATED A1C: CPT | Performed by: HOSPITALIST

## 2017-12-06 PROCEDURE — 85025 COMPLETE CBC W/AUTO DIFF WBC: CPT | Performed by: HOSPITALIST

## 2017-12-06 PROCEDURE — G8980 MOBILITY D/C STATUS: HCPCS

## 2017-12-06 PROCEDURE — 83880 ASSAY OF NATRIURETIC PEPTIDE: CPT | Performed by: HOSPITALIST

## 2017-12-06 PROCEDURE — 80061 LIPID PANEL: CPT | Performed by: HOSPITALIST

## 2017-12-06 PROCEDURE — 84443 ASSAY THYROID STIM HORMONE: CPT | Performed by: HOSPITALIST

## 2017-12-06 PROCEDURE — 96372 THER/PROPH/DIAG INJ SC/IM: CPT

## 2017-12-06 PROCEDURE — 97162 PT EVAL MOD COMPLEX 30 MIN: CPT

## 2017-12-06 PROCEDURE — 80053 COMPREHEN METABOLIC PANEL: CPT | Performed by: HOSPITALIST

## 2017-12-06 PROCEDURE — 99218 PR INITIAL OBSERVATION CARE/DAY 30 MINUTES: CPT | Performed by: ORTHOPAEDIC SURGERY

## 2017-12-06 PROCEDURE — 25010000002 ENOXAPARIN PER 10 MG: Performed by: HOSPITALIST

## 2017-12-06 PROCEDURE — G8978 MOBILITY CURRENT STATUS: HCPCS

## 2017-12-06 PROCEDURE — G8979 MOBILITY GOAL STATUS: HCPCS

## 2017-12-06 RX ADMIN — RIVAROXABAN 15 MG: 15 TABLET, FILM COATED ORAL at 17:34

## 2017-12-06 RX ADMIN — LISINOPRIL 20 MG: 20 TABLET ORAL at 08:03

## 2017-12-06 RX ADMIN — ASPIRIN 81 MG: 81 TABLET ORAL at 08:03

## 2017-12-06 RX ADMIN — ENOXAPARIN SODIUM 100 MG: 100 INJECTION SUBCUTANEOUS at 03:11

## 2017-12-06 RX ADMIN — CALCIUM POLYCARBOPHIL 625 MG: 625 TABLET, FILM COATED ORAL at 08:03

## 2017-12-06 NOTE — PLAN OF CARE
Problem: Patient Care Overview (Adult)  Goal: Plan of Care Review  Outcome: Ongoing (interventions implemented as appropriate)    12/06/17 3350   Coping/Psychosocial Response Interventions   Plan Of Care Reviewed With patient   Patient Care Overview   Progress progress toward functional goals as expected   Outcome Evaluation   Outcome Summary/Follow up Plan Patient up ad [place] with crutches, no issues or concerns. Patient states pain in R calf however declines pain medication. Patient rested well throughout the night. Will continue to monitor.          Problem: Fall Risk (Adult)  Goal: Identify Related Risk Factors and Signs and Symptoms  Outcome: Ongoing (interventions implemented as appropriate)  Goal: Absence of Falls  Outcome: Ongoing (interventions implemented as appropriate)    Problem: Pain, Acute (Adult)  Goal: Identify Related Risk Factors and Signs and Symptoms  Outcome: Ongoing (interventions implemented as appropriate)  Goal: Acceptable Pain Control/Comfort Level  Outcome: Ongoing (interventions implemented as appropriate)

## 2017-12-06 NOTE — PLAN OF CARE
Problem: Patient Care Overview (Adult)  Goal: Plan of Care Review  Outcome: Ongoing (interventions implemented as appropriate)    12/06/17 1703   Coping/Psychosocial Response Interventions   Plan Of Care Reviewed With patient   Outcome Evaluation   Outcome Summary/Follow up Plan pt denies pain, ambulated in [place], scd on left leg, starting xarelto tonight. vss, sr on monitor.       Goal: Adult Individualization and Mutuality  Outcome: Ongoing (interventions implemented as appropriate)  Goal: Discharge Needs Assessment  Outcome: Ongoing (interventions implemented as appropriate)    Problem: Fall Risk (Adult)  Goal: Identify Related Risk Factors and Signs and Symptoms  Outcome: Ongoing (interventions implemented as appropriate)  Goal: Absence of Falls  Outcome: Ongoing (interventions implemented as appropriate)    Problem: Pain, Acute (Adult)  Goal: Identify Related Risk Factors and Signs and Symptoms  Outcome: Ongoing (interventions implemented as appropriate)  Goal: Acceptable Pain Control/Comfort Level  Outcome: Ongoing (interventions implemented as appropriate)

## 2017-12-06 NOTE — SIGNIFICANT NOTE
12/06/17 1339   Rehab Treatment   Discipline occupational therapist   Rehab Evaluation   Evaluation Not Performed patient/family declined evaluation  (Pt denies need for OT. States up ambulating on own and denies need for assist with adls. States has assist if needed. Will d/c from OT at this time)

## 2017-12-06 NOTE — PROGRESS NOTES
"Inpatient Initial Visit      Patient: Henry Campos    Date of Admission: 12/4/2017  4:40 PM    YOB: 1960    Medical Record Number: [id number]    Attending Physician: Artur Brito MD  Consulting Physician: Nicola Matias MD      Chief Complaints: Acute deep vein thrombosis (DVT) of other specified vein of right lower extremity [I82.491], hamstring tear on the right      History of Present Illness: 57 y.o. male admitted to [facility] with Acute deep vein thrombosis (DVT) of other specified vein of right lower extremity [I82.491]. I was requested to advise on hamstring.  Had a slip.  Hx of gastroc tears.  Was 2 weeks ago.  Had hematoma and developed a DVT.  Seeing dr marin but asked to see \"since she doesn't come here.\"      Allergies: No Known Allergies    Medications:   Home Medications:  Prescriptions Prior to Admission   Medication Sig Dispense Refill Last Dose   • aspirin 81 MG EC tablet Take 81 mg by mouth Daily.   12/3/2017       Current Medications:  Scheduled Meds:  aspirin 81 mg Oral Daily   calcium polycarbophil 625 mg Oral Daily   enoxaparin 1 mg/kg Subcutaneous Q12H   lisinopril 20 mg Oral Q24H   pantoprazole 40 mg Oral Q AM     Continuous Infusions:  Pharmacy to Dose enoxaparin (LOVENOX)      PRN Meds:.Pharmacy to Dose enoxaparin (LOVENOX)    I have reviewed the patient's medical history in detail and updated the computerized patient record.  Review and summarization of old records include:    Past Medical History:   Diagnosis Date   • DVT (deep venous thrombosis)    • Heart murmur    • Hyperlipidemia    • Hypertension         Past Surgical History:   Procedure Laterality Date   • ADENOIDECTOMY     • KNEE SURGERY     • SHOULDER SURGERY     • TONSILLECTOMY          Social History     Occupational History   • Not on file.     Social History Main Topics   • Smoking status: Never Smoker   • Smokeless tobacco: Never Used   • Alcohol use Yes      Comment: socially   • Drug use: No "   • Sexual activity: Defer    Social History     Social History Narrative   • No narrative on file      History reviewed. No pertinent family history.      ROS: 14 point review of systems was performed and was negative except for documented findings in HPI and today's note.     Physical Exam:  57 y.o. y.o. male.  Body mass index is 33.67 kg/(m^2).. Vitals:    12/05/17 1449 12/05/17 2041 12/05/17 2300 12/06/17 0739   BP: 121/74 121/85 111/76 114/76   BP Location: Right arm Right arm Right arm Right arm   Patient Position: Lying Lying Lying Lying   Pulse: 84 96 77 76   Resp: 16 16 18 16   Temp: 97.9 °F (36.6 °C) 98 °F (36.7 °C) 98.1 °F (36.7 °C) 98.9 °F (37.2 °C)   TempSrc: Oral Oral Oral Oral   SpO2: 95% 93% 96% 96%   Weight:       Height:           Vital signs reviewed.   General: Alert, cooperative, in no acute distress   Eyes: conjunctiva clear  ENT: external ears and nose atraumatic  CV: no peripheral edema  Resp: normal respiratory effort  Skin: no rashes or wounds; normal turgor  Psych: mood and affect appropriate  Lymph: no nodes appreciated  Neuro: gross sensation intact  Vascular:  Palpable peripheral pulse in noted extremity  Musculoskeletal Extremities: min tenderness over the right ischium.  Mod swelling in the leg.  Distal nvi.  No hip apin with rom and loading.  Old large medial knee scar.  Can do passive ext at the knee with the hip flexed.        Diagnostic Tests:    Admission on 12/04/2017   Component Date Value Ref Range Status   • Glucose 12/04/2017 95  65 - 99 mg/dL Final   • BUN 12/04/2017 8  6 - 20 mg/dL Final   • Creatinine 12/04/2017 0.61* 0.76 - 1.27 mg/dL Final   • Sodium 12/04/2017 138  136 - 145 mmol/L Final   • Potassium 12/04/2017 4.2  3.5 - 5.2 mmol/L Final   • Chloride 12/04/2017 97* 98 - 107 mmol/L Final   • CO2 12/04/2017 28.3  22.0 - 29.0 mmol/L Final   • Calcium 12/04/2017 9.9  8.6 - 10.5 mg/dL Final   • Total Protein 12/04/2017 7.8  6.0 - 8.5 g/dL Final   • Albumin 12/04/2017 4.60   3.50 - 5.20 g/dL Final   • ALT (SGPT) 12/04/2017 22  1 - 41 U/L Final   • AST (SGOT) 12/04/2017 20  1 - 40 U/L Final   • Alkaline Phosphatase 12/04/2017 67  39 - 117 U/L Final   • Total Bilirubin 12/04/2017 0.5  0.1 - 1.2 mg/dL Final   • eGFR Non African Amer 12/04/2017 137  >60 mL/min/1.73 Final   • Globulin 12/04/2017 3.2  gm/dL Final   • A/G Ratio 12/04/2017 1.4  g/dL Final   • BUN/Creatinine Ratio 12/04/2017 13.1  7.0 - 25.0 Final   • Anion Gap 12/04/2017 12.7  mmol/L Final   • Protime 12/04/2017 13.4  11.7 - 14.2 Seconds Final   • INR 12/04/2017 1.06  0.90 - 1.10 Final   • PTT 12/04/2017 28.7  22.7 - 35.4 seconds Final   • WBC 12/04/2017 12.38* 4.50 - 10.70 10*3/mm3 Final   • RBC 12/04/2017 4.74  4.60 - 6.00 10*6/mm3 Final   • Hemoglobin 12/04/2017 14.9  13.7 - 17.6 g/dL Final   • Hematocrit 12/04/2017 43.6  40.4 - 52.2 % Final   • MCV 12/04/2017 92.0  79.8 - 96.2 fL Final   • MCH 12/04/2017 31.4  27.0 - 32.7 pg Final   • MCHC 12/04/2017 34.2  32.6 - 36.4 g/dL Final   • RDW 12/04/2017 12.4  11.5 - 14.5 % Final   • RDW-SD 12/04/2017 41.9  37.0 - 54.0 fl Final   • MPV 12/04/2017 10.5  6.0 - 12.0 fL Final   • Platelets 12/04/2017 199  140 - 500 10*3/mm3 Final   • Neutrophil % 12/04/2017 73.7  42.7 - 76.0 % Final   • Lymphocyte % 12/04/2017 16.8* 19.6 - 45.3 % Final   • Monocyte % 12/04/2017 6.3  5.0 - 12.0 % Final   • Eosinophil % 12/04/2017 2.5  0.3 - 6.2 % Final   • Basophil % 12/04/2017 0.4  0.0 - 1.5 % Final   • Immature Grans % 12/04/2017 0.3  0.0 - 0.5 % Final   • Neutrophils, Absolute 12/04/2017 9.12* 1.90 - 8.10 10*3/mm3 Final   • Lymphocytes, Absolute 12/04/2017 2.08  0.90 - 4.80 10*3/mm3 Final   • Monocytes, Absolute 12/04/2017 0.78  0.20 - 1.20 10*3/mm3 Final   • Eosinophils, Absolute 12/04/2017 0.31  0.00 - 0.70 10*3/mm3 Final   • Basophils, Absolute 12/04/2017 0.05  0.00 - 0.20 10*3/mm3 Final   • Immature Grans, Absolute 12/04/2017 0.04* 0.00 - 0.03 10*3/mm3 Final   • PTT 12/05/2017 94.5* 22.7 - 35.4  seconds Final   • PTT 12/05/2017 91.0* 22.7 - 35.4 seconds Final   • Glucose 12/05/2017 95  65 - 99 mg/dL Final   • BUN 12/05/2017 10  6 - 20 mg/dL Final   • Creatinine 12/05/2017 0.68* 0.76 - 1.27 mg/dL Final   • Sodium 12/05/2017 138  136 - 145 mmol/L Final   • Potassium 12/05/2017 4.1  3.5 - 5.2 mmol/L Final   • Chloride 12/05/2017 98  98 - 107 mmol/L Final   • CO2 12/05/2017 28.2  22.0 - 29.0 mmol/L Final   • Calcium 12/05/2017 9.3  8.6 - 10.5 mg/dL Final   • Total Protein 12/05/2017 7.2  6.0 - 8.5 g/dL Final   • Albumin 12/05/2017 4.30  3.50 - 5.20 g/dL Final   • ALT (SGPT) 12/05/2017 17  1 - 41 U/L Final   • AST (SGOT) 12/05/2017 15  1 - 40 U/L Final   • Alkaline Phosphatase 12/05/2017 66  39 - 117 U/L Final   • Total Bilirubin 12/05/2017 0.6  0.1 - 1.2 mg/dL Final   • eGFR Non African Amer 12/05/2017 121  >60 mL/min/1.73 Final   • Globulin 12/05/2017 2.9  gm/dL Final   • A/G Ratio 12/05/2017 1.5  g/dL Final   • BUN/Creatinine Ratio 12/05/2017 14.7  7.0 - 25.0 Final   • Anion Gap 12/05/2017 11.8  mmol/L Final   • WBC 12/05/2017 10.02  4.50 - 10.70 10*3/mm3 Final   • RBC 12/05/2017 4.43* 4.60 - 6.00 10*6/mm3 Final   • Hemoglobin 12/05/2017 13.9  13.7 - 17.6 g/dL Final   • Hematocrit 12/05/2017 41.1  40.4 - 52.2 % Final   • MCV 12/05/2017 92.8  79.8 - 96.2 fL Final   • MCH 12/05/2017 31.4  27.0 - 32.7 pg Final   • MCHC 12/05/2017 33.8  32.6 - 36.4 g/dL Final   • RDW 12/05/2017 12.4  11.5 - 14.5 % Final   • RDW-SD 12/05/2017 42.1  37.0 - 54.0 fl Final   • MPV 12/05/2017 10.5  6.0 - 12.0 fL Final   • Platelets 12/05/2017 204  140 - 500 10*3/mm3 Final   • Neutrophil % 12/05/2017 63.3  42.7 - 76.0 % Final   • Lymphocyte % 12/05/2017 25.1  19.6 - 45.3 % Final   • Monocyte % 12/05/2017 6.7  5.0 - 12.0 % Final   • Eosinophil % 12/05/2017 4.1  0.3 - 6.2 % Final   • Basophil % 12/05/2017 0.6  0.0 - 1.5 % Final   • Immature Grans % 12/05/2017 0.2  0.0 - 0.5 % Final   • Neutrophils, Absolute 12/05/2017 6.34  1.90 - 8.10  10*3/mm3 Final   • Lymphocytes, Absolute 12/05/2017 2.52  0.90 - 4.80 10*3/mm3 Final   • Monocytes, Absolute 12/05/2017 0.67  0.20 - 1.20 10*3/mm3 Final   • Eosinophils, Absolute 12/05/2017 0.41  0.00 - 0.70 10*3/mm3 Final   • Basophils, Absolute 12/05/2017 0.06  0.00 - 0.20 10*3/mm3 Final   • Immature Grans, Absolute 12/05/2017 0.02  0.00 - 0.03 10*3/mm3 Final   • Glucose 12/06/2017 95  65 - 99 mg/dL Final   • BUN 12/06/2017 12  6 - 20 mg/dL Final   • Creatinine 12/06/2017 0.67* 0.76 - 1.27 mg/dL Final   • Sodium 12/06/2017 138  136 - 145 mmol/L Final   • Potassium 12/06/2017 3.9  3.5 - 5.2 mmol/L Final   • Chloride 12/06/2017 100  98 - 107 mmol/L Final   • CO2 12/06/2017 29.2* 22.0 - 29.0 mmol/L Final   • Calcium 12/06/2017 9.0  8.6 - 10.5 mg/dL Final   • Total Protein 12/06/2017 7.2  6.0 - 8.5 g/dL Final   • Albumin 12/06/2017 4.30  3.50 - 5.20 g/dL Final   • ALT (SGPT) 12/06/2017 15  1 - 41 U/L Final   • AST (SGOT) 12/06/2017 11  1 - 40 U/L Final   • Alkaline Phosphatase 12/06/2017 61  39 - 117 U/L Final   • Total Bilirubin 12/06/2017 0.6  0.1 - 1.2 mg/dL Final   • eGFR Non African Amer 12/06/2017 123  >60 mL/min/1.73 Final   • Globulin 12/06/2017 2.9  gm/dL Final   • A/G Ratio 12/06/2017 1.5  g/dL Final   • BUN/Creatinine Ratio 12/06/2017 17.9  7.0 - 25.0 Final   • Anion Gap 12/06/2017 8.8  mmol/L Final   • proBNP 12/06/2017 <5.0* 5.0 - 900.0 pg/mL Final   • TSH 12/06/2017 2.630  0.270 - 4.200 mIU/mL Final   • Total Cholesterol 12/06/2017 165  0 - 200 mg/dL Final   • Triglycerides 12/06/2017 131  0 - 150 mg/dL Final   • HDL Cholesterol 12/06/2017 38* 40 - 60 mg/dL Final   • LDL Cholesterol  12/06/2017 101* 0 - 100 mg/dL Final   • VLDL Cholesterol 12/06/2017 26.2  5 - 40 mg/dL Final   • LDL/HDL Ratio 12/06/2017 2.65   Final   • Hemoglobin A1C 12/06/2017 5.60  4.80 - 5.60 % Final   • WBC 12/06/2017 9.33  4.50 - 10.70 10*3/mm3 Final   • RBC 12/06/2017 4.45* 4.60 - 6.00 10*6/mm3 Final   • Hemoglobin 12/06/2017 13.8   13.7 - 17.6 g/dL Final   • Hematocrit 12/06/2017 41.3  40.4 - 52.2 % Final   • MCV 12/06/2017 92.8  79.8 - 96.2 fL Final   • MCH 12/06/2017 31.0  27.0 - 32.7 pg Final   • MCHC 12/06/2017 33.4  32.6 - 36.4 g/dL Final   • RDW 12/06/2017 12.3  11.5 - 14.5 % Final   • RDW-SD 12/06/2017 41.7  37.0 - 54.0 fl Final   • MPV 12/06/2017 10.5  6.0 - 12.0 fL Final   • Platelets 12/06/2017 192  140 - 500 10*3/mm3 Final   • Neutrophil % 12/06/2017 60.9  42.7 - 76.0 % Final   • Lymphocyte % 12/06/2017 24.1  19.6 - 45.3 % Final   • Monocyte % 12/06/2017 9.2  5.0 - 12.0 % Final   • Eosinophil % 12/06/2017 5.1  0.3 - 6.2 % Final   • Basophil % 12/06/2017 0.5  0.0 - 1.5 % Final   • Immature Grans % 12/06/2017 0.2  0.0 - 0.5 % Final   • Neutrophils, Absolute 12/06/2017 5.67  1.90 - 8.10 10*3/mm3 Final   • Lymphocytes, Absolute 12/06/2017 2.25  0.90 - 4.80 10*3/mm3 Final   • Monocytes, Absolute 12/06/2017 0.86  0.20 - 1.20 10*3/mm3 Final   • Eosinophils, Absolute 12/06/2017 0.48  0.00 - 0.70 10*3/mm3 Final   • Basophils, Absolute 12/06/2017 0.05  0.00 - 0.20 10*3/mm3 Final   • Immature Grans, Absolute 12/06/2017 0.02  0.00 - 0.03 10*3/mm3 Final       Mri Pelvis Without Contrast    Result Date: 11/30/2017  Narrative: MRI PELVIS WITHOUT CONTRAST  HISTORY: Recent injury with pain along the posterior groin area extending down the leg. Possible hamstring tear.  TECHNIQUE: MRI of the pelvis and proximal thighs was performed using axial and coronal T1 and STIR sequences, and axial fat-saturated T1 sequence, and sagittal fat-saturated proton density sequence. This exam shows the proximal 34 cm of the femurs on the coronal images and shows the pelvis as high as about 27 mm above the top of the acetabular roof.  FINDINGS: There is an acute or subacute tear at the right hamstring involving the origin of the semimembranosus. The torn tendon is distally retracted about 32 mm and surrounded by edema. The tendon demonstrates thickening and edema  along its proximal 3-4 cm. There is an intermuscular hematoma at about the mid femoral shaft level measuring 37 x 23 mm axial and 54 mm long. This demonstrates typical signal characteristics for hematoma, demonstrating both high signal and low signal on both T1 and T2 sequences. The hematoma is positioned anterior to the semimembranosus tendon and has some mass effect on the adjacent adductor felicia muscle. The sciatic nerve is about 12 mm lateral to the hematoma and there is only some scant soft tissue edema extending as far lateral as that nerve. The nerve itself appears normal. The semitendinosus and biceps femoris remain intact. Other musculature of both thighs appears normal. There is mild edema at the left hamstring origin without tear.  The coronal images demonstrate small subcortical cyst at the acetabulum bilaterally, more extensive on the right than the left. There is also a small ridge of marginal osteophyte at the femoral head neck junction bilaterally. The other muscles and tendons around the hips appear normal and the visualized lower pelvic structures appear normal.      Impression: Acute or subacute tear of the right hamstring origin involving the semimembranosus tendon which is distally retracted. There is an intermuscular hematoma between the semimembranosus and the adductor felicia at the mid thigh level, dimensions as above. The other structures of the right hamstring mechanism remain intact.  This report was finalized on 11/30/2017 2:02 PM by Dr. Peter Champion MD.        Personally viewed ortho images and ortho images and report     Assessment:  Patient Active Problem List   Diagnosis   • Deep venous thrombosis   right prox hamstring tear        Plan:  Recommend dvt management.  Can ambulate etc as he can tolerate once cleared regarding that.  If he is weak or has pain could do PT as an outpt.    Please send a copy of this report to referring physician.     Date: 12/6/2017    Nicola Matias,  MD

## 2017-12-06 NOTE — THERAPY EVALUATION
Acute Care - Physical Therapy Initial Evaluation  [facility]     Patient Name: Henry Campos  : 1960  MRN: [id number]  Today's Date: 2017   Onset of Illness/Injury or Date of Surgery Date: 17  Date of Referral to PT: 17  Referring Physician: Dr. Brito      Admit Date: 2017     Visit Dx:    ICD-10-CM ICD-9-CM   1. Acute deep vein thrombosis (DVT) of other specified vein of right lower extremity I82.491 453.40     Patient Active Problem List   Diagnosis   • Deep venous thrombosis     Past Medical History:   Diagnosis Date   • DVT (deep venous thrombosis)    • Heart murmur    • Hyperlipidemia    • Hypertension      Past Surgical History:   Procedure Laterality Date   • ADENOIDECTOMY     • KNEE SURGERY     • SHOULDER SURGERY     • TONSILLECTOMY            PT ASSESSMENT (last 72 hours)      PT Evaluation       17 1100 17 0907    Rehab Evaluation    Document Type evaluation  -MA     Subjective Information agree to therapy;complains of;pain   R knee pain  -MA     Patient Effort, Rehab Treatment good  -MA     Symptoms Noted During/After Treatment none  -MA     General Information    Patient Profile Review yes  -MA     Onset of Illness/Injury or Date of Surgery Date 17  -MA     Referring Physician Dr. Brito  -MA     General Observations supine in bed with HOB elevated, no acute distress noted at rest  -MA     Pertinent History Of Current Problem Tore R hamstring resulting with R hematoma leading to acute DVT  -MA     Precautions/Limitations no known precautions/limitations  -MA     Prior Level of Function independent:;all household mobility;using stairs;work  -MA     Equipment Currently Used at Home crutches  -MA crutches  -VN    Plans/Goals Discussed With patient;spouse/S.O.  -MA     Living Environment    Lives With  spouse  -VN    Living Arrangements  house  -VN    Home Accessibility  no concerns  -VN    Stair Railings at Home  none  -VN    Type of Financial/Environmental  Concern  none  -VN    Transportation Available  car;family or friend will provide  -VN    Living Environment Comment Patient voiced no stair concerns.  -MA     Clinical Impression    Date of Referral to PT 12/06/17  -MA     Criteria for Skilled Therapeutic Interventions Met no;current level of function same as previous level of function  -MA     Pain Assessment    Pain Assessment --   Report of R knee pain w mobility- arthritic  -MA     Vision Assessment/Intervention    Visual Impairment WFL  -MA     Cognitive Assessment/Intervention    Current Cognitive/Communication Assessment functional  -MA     Orientation Status oriented x 4  -MA     Follows Commands/Answers Questions 100% of the time;able to follow multi-step instructions  -MA     Personal Safety WNL/WFL;good awareness, safety precautions  -MA     Personal Safety Interventions fall prevention program maintained;gait belt;nonskid shoes/slippers when out of bed  -MA     ROM (Range of Motion)    General ROM no range of motion deficits identified  -MA     MMT (Manual Muscle Testing)    General MMT Assessment no strength deficits identified  -MA     Mobility Assessment/Training    Extremity Weight-Bearing Status --   no clear WBing status, assuming WBAT of R LE  -MA     Bed Mobility, Assessment/Treatment    Bed Mobility, Assistive Device bed rails;head of bed elevated  -MA     Bed Mobility, Scoot/Bridge, [place] independent  -MA     Bed Mob, Supine to Sit, [place] independent  -MA     Bed Mob, Sit to Supine, [place] independent  -MA     Transfer Assessment/Treatment    Transfers, Sit-Stand [place] independent  -MA     Transfers, Stand-Sit [place] independent  -MA     Gait Assessment/Treatment    Gait, [place] Level supervision required  -MA     Gait, Assistive Device --   no AD  -MA     Gait, Distance (Feet) 150  -MA     Gait, Gait Pattern Analysis swing-through gait  -MA     Gait, Gait Deviations marilyn decreased;step length  decreased;stride length decreased  -MA     Gait, Impairments pain  -MA     Gait, Comment No overt LOB when ambulating  -MA     Positioning and Restraints    Pre-Treatment Position in bed  -MA     Post Treatment Position bed  -MA     In Bed notified nsg;sitting EOB;call light within reach;encouraged to call for assist;with family/caregiver  -MA       12/04/17 2043 12/04/17 2038    General Information    Equipment Currently Used at Home  crutches, axillary  -CD    Living Environment    Lives With spouse  -CD     Living Arrangements house  -CD     Home Accessibility no concerns  -CD     Stair Railings at Home none  -CD     Type of Financial/Environmental Concern none  -CD     Transportation Available family or friend will provide  -CD       User Key  (r) = Recorded By, (t) = Taken By, (c) = Cosigned By    Initials Name Provider Type    CD Karli Garcia, RN Registered Nurse    SOPHIE Breaux, PO Case Manager    ANN Quezada PT Physical Therapist          Physical Therapy Education     Title: PT OT SLP Therapies (Resolved)     Topic: Physical Therapy (Resolved)     Point: Mobility training (Resolved)    Learning Progress Summary    Learner Readiness Method Response Comment Documented by Status   Patient Acceptance E VU  MA 12/06/17 1153 Done                      User Key     Initials Effective Dates Name Provider Type Discipline    MA 12/13/16 -  Tara Quezada, ALAN Physical Therapist PT                PT Recommendation and Plan  Anticipated Discharge Disposition: home with outpatient services (OP PT to follow up for R hamstring if deemed necessary)  PT Frequency: evaluation only  Plan of Care Review  Plan Of Care Reviewed With: patient  Outcome Summary/Follow up Plan: Patient is a pleasant 57 y.o. who presents with a R hamstring tear resulting in R acute DVT. Per patient and chart review, no procedure will be performed. PLOF includes being completely independent with ADLs and lives at home with spouse.  Patient performed all mobility with independence/SV. Access to crutches if needed. Reported R knee pain only. Based on clinical presentation, patient does not require skilled PT services acutely at this time. Anticipate DC home with HH OP PT if deemed necessary when medically appropriate.              Outcome Measures       12/06/17 1100          How much help from another person do you currently need...    Turning from your back to your side while in flat bed without using bedrails? 4  -MA      Moving from lying on back to sitting on the side of a flat bed without bedrails? 4  -MA      Moving to and from a bed to a chair (including a wheelchair)? 4  -MA      Standing up from a chair using your arms (e.g., wheelchair, bedside chair)? 4  -MA      Climbing 3-5 steps with a railing? 3  -MA      To walk in hospital room? 4  -MA      AM-PAC 6 Clicks Score 23  -MA      Functional Assessment    Outcome Measure Options AM-PAC 6 Clicks Basic Mobility (PT)  -MA        User Key  (r) = Recorded By, (t) = Taken By, (c) = Cosigned By    Initials Name Provider Type    ANN Quezada PT Physical Therapist           Time Calculation:         PT Charges       12/06/17 1154          Time Calculation    Start Time 1124  -MA      Stop Time 1148  -MA      Time Calculation (min) 24 min  -MA      PT Received On 12/06/17  -MA        User Key  (r) = Recorded By, (t) = Taken By, (c) = Cosigned By    Initials Name Provider Type    ANN Quezada PT Physical Therapist          Therapy Charges for Today     Code Description Service Date Service Provider Modifiers Qty    79145329119 HC PT EVAL MOD COMPLEXITY 2 12/6/2017 Tara Quezada, PT GP 1          PT G-Codes  Outcome Measure Options: AM-PAC 6 Clicks Basic Mobility (PT)      Tara Quezada PT  12/6/2017

## 2017-12-06 NOTE — PROGRESS NOTES
Discharge Planning Assessment  [facility]     Patient Name: Henry Campos  MRN: [id number]  Today's Date: 12/6/2017    Admit Date: 12/4/2017          Discharge Needs Assessment       12/06/17 0907    Living Environment    Lives With spouse    Living Arrangements house    Home Accessibility no concerns    Stair Railings at Home none    Type of Financial/Environmental Concern none    Transportation Available car;family or friend will provide    Living Environment    Primary Care Provided By spouse/significant other    Quality Of Family Relationships supportive;involved;helpful    Able to Return to Prior Living Arrangements yes    Discharge Needs Assessment    Concerns To Be Addressed denies needs/concerns at this time;basic needs concerns;discharge planning concerns;home safety concerns    Readmission Within The Last 30 Days no previous admission in last 30 days    Community Agency Name(S) None    Anticipated Changes Related to Illness none    Equipment Currently Used at Home crutches    Equipment Needed After Discharge none    Current Discharge Risk physical impairment    Discharge Disposition still a patient    Discharge Contact Information if Applicable Sheila Campos wife [phone]    Discharge Planning Comments Plans home with wife -- no needs            Discharge Plan       12/06/17 0909    Case Management/Social Work Plan    Plan Plans home with wife -- no needs    Patient/Family In Agreement With Plan yes    Additional Comments Confirmed face sheet correct. No IMM ([place]).         Discharge Placement     No information found                Demographic Summary       12/06/17 0906    Referral Information    Admission Type observation    Arrived From home or self-care    Referral Source admission list    Reason For Consult discharge planning    Record Reviewed clinical discipline documentation;history and physical;medical record    Contact Information    Permission Granted to Share Information With case  manager;family/designee    Comments Wife Sheila    Primary Care Physician Information    Name Dr. Gus Morton            Functional Status       12/06/17 0906    Functional Status Current    Ambulation 3-->assistive equipment and person    Transferring 3-->assistive equipment and person    Toileting 3-->assistive equipment and person    Bathing 0-->independent    Dressing 0-->independent    Eating 0-->independent    Communication 0-->understands/communicates without difficulty    Swallowing (if score 2 or more for any item, consult Rehab Services) 0-->swallows foods/liquids without difficulty    Current Functional Level Comment Crutches at bedside    Change in Functional Status Since Onset of Current Illness/Injury yes    Functional Status Prior    Ambulation 1-->assistive equipment    Transferring 1-->assistive equipment    Toileting 1-->assistive equipment    Bathing 0-->independent    Dressing 0-->independent    Eating 0-->independent    Communication 0-->understands/communicates without difficulty    Swallowing 0-->swallows foods/liquids without difficulty    Activity Tolerance    Usual Activity Tolerance moderate    Current Activity Tolerance moderate            Psychosocial     None            Abuse/Neglect     None            Legal     None            Substance Abuse     None            Patient Forms     None          Rehana Breaux RN  Continued Stay Note  [facility]     Patient Name: Henry Campos  MRN: [id number]  Today's Date: 12/6/2017    Admit Date: 12/4/2017          Discharge Plan       12/06/17 0909    Case Management/Social Work Plan    Plan Plans home with wife -- no needs    Patient/Family In Agreement With Plan yes    Additional Comments Confirmed face sheet correct. No IMM ([place]).               Discharge Codes     None            Rehana Breaux RN

## 2017-12-06 NOTE — PLAN OF CARE
Problem: Patient Care Overview (Adult)  Goal: Plan of Care Review    12/06/17 1149   Coping/Psychosocial Response Interventions   Plan Of Care Reviewed With patient   Outcome Evaluation   Outcome Summary/Follow up Plan Patient is a pleasant 57 y.o. who presents with a R hamstring tear resulting in R acute DVT. Per patient and chart review, no procedure will be performed. PLOF includes being completely independent with ADLs and lives at home with spouse. Patient performed all mobility with independence/SV. Access to crutches if needed. Reported R knee pain only. Based on clinical presentation, patient does not require skilled PT services acutely at this time. Anticipate DC home with HH OP PT if deemed necessary when medically appropriate.

## 2017-12-06 NOTE — PROGRESS NOTES
"Daily progress note    Chief complaint  Doing better  No new complaints    History of present illness  Pt is a 57 y.o. male who presents with R calf pain and swelling onset a few days ago. Pt reports he was sleeping on a tarp a week ago and heard something \"tear.\" Pt reports he waited 4-5 days after sustaining the injury to his R leg to go see Dr. Yepez (orthopedist). The day before he saw his orthopedist, he developed pain and swelling to his R calf. When he saw Dr. Yepez, he was informed he tore his hamstring. Pt also reports that when he was seen by Dr. Yepez, they recommended he have an US to rule out a DVT. He reports going to [facility] for an US today who then told him he had a DVT. PT denies any current CP or SOA.  Patient denies any chest pain shortness of breath abdominal pain nausea vomiting diarrhea.  Patient still hurting at the right calf at the time of interview she also denies any fevers chills night sweats or weight loss     REVIEW OF SYSTEMS  Review of Systems   Constitutional: Negative for activity change, appetite change and fever.   HENT: Negative for congestion and sore throat.    Eyes: Negative.    Respiratory: Negative for cough and shortness of breath.    Cardiovascular: Negative for chest pain.   Gastrointestinal: Negative for abdominal pain, diarrhea and vomiting.   Endocrine: Negative.    Genitourinary: Negative for decreased urine volume and dysuria.   Musculoskeletal: Positive for myalgias (R calf). Negative for neck pain.   Skin: Negative for rash and wound.   Allergic/Immunologic: Negative.    Neurological: Negative for weakness, numbness and headaches.   Hematological: Negative.    Psychiatric/Behavioral: Negative.    All other systems reviewed and are negative.     PHYSICAL EXAM  Blood pressure 114/76, pulse 76, temperature 98.9 °F (37.2 °C), temperature source Oral, resp. rate 16, height 170.2 cm (67\"), weight 97.5 kg (215 lb), SpO2 96 %.    Constitutional: He is " oriented to person, place, and time and well-developed, well-nourished, and in no distress. No distress.  Head: Normocephalic and atraumatic.   Mouth/Throat: Oropharynx is clear and moist.   Eyes: EOM are normal. Pupils are equal, round, and reactive to light.   Neck: Normal range of motion. Neck supple.   Cardiovascular: Normal rate, regular rhythm and normal heart sounds.    Pulses:       Dorsalis pedis pulses are 2+ on the right side, and 2+ on the left side.        Posterior tibial pulses are 2+ on the right side, and 2+ on the left side.   Pulmonary/Chest: Effort normal and breath sounds normal. No respiratory distress. He has no wheezes. He exhibits no tenderness.   Abdominal: Soft. He exhibits no distension. There is no tenderness. There is no rebound and no guarding.   Musculoskeletal:        Right lower leg: He exhibits tenderness (medial calf).   Lymphadenopathy:     He has no cervical adenopathy.   Neurological: He is alert and oriented to person, place, and time.   Skin: Skin is warm and dry. No rash noted. No pallor.   There is no obvious swelling, erythema or warmth to the medial calf. There is yellow to purple bruising on the R distal medial thigh that tracks up through his thigh.    Psychiatric: Mood, memory, affect and judgment normal.     LAB RESULTS  Lab Results (last 24 hours)     Procedure Component Value Units Date/Time    CBC & Differential [[id number]] Collected:  12/06/17 0708    Specimen:  Blood Updated:  12/06/17 0754    Narrative:       The following orders were created for panel order CBC & Differential.  Procedure                               Abnormality         Status                     ---------                               -----------         ------                     CBC Auto Differential[[id number]]        Abnormal            Final result                 Please view results for these tests on the individual orders.    CBC Auto Differential [[id number]]  (Abnormal) Collected:   12/06/17 0708    Specimen:  Blood Updated:  12/06/17 0754     WBC 9.33 10*3/mm3      RBC 4.45 (L) 10*6/mm3      Hemoglobin 13.8 g/dL      Hematocrit 41.3 %      MCV 92.8 fL      MCH 31.0 pg      MCHC 33.4 g/dL      RDW 12.3 %      RDW-SD 41.7 fl      MPV 10.5 fL      Platelets 192 10*3/mm3      Neutrophil % 60.9 %      Lymphocyte % 24.1 %      Monocyte % 9.2 %      Eosinophil % 5.1 %      Basophil % 0.5 %      Immature Grans % 0.2 %      Neutrophils, Absolute 5.67 10*3/mm3      Lymphocytes, Absolute 2.25 10*3/mm3      Monocytes, Absolute 0.86 10*3/mm3      Eosinophils, Absolute 0.48 10*3/mm3      Basophils, Absolute 0.05 10*3/mm3      Immature Grans, Absolute 0.02 10*3/mm3     Hemoglobin A1c [[id number]]  (Normal) Collected:  12/06/17 0708    Specimen:  Blood Updated:  12/06/17 0802     Hemoglobin A1C 5.60 %     Narrative:       Hemoglobin A1C Ranges:    Increased Risk for Diabetes  5.7% to 6.4%  Diabetes                     >= 6.5%  Diabetic Goal                < 7.0%    Comprehensive Metabolic Panel [[id number]]  (Abnormal) Collected:  12/06/17 0708    Specimen:  Blood Updated:  12/06/17 0814     Glucose 95 mg/dL      BUN 12 mg/dL      Creatinine 0.67 (L) mg/dL      Sodium 138 mmol/L      Potassium 3.9 mmol/L      Chloride 100 mmol/L      CO2 29.2 (H) mmol/L      Calcium 9.0 mg/dL      Total Protein 7.2 g/dL      Albumin 4.30 g/dL      ALT (SGPT) 15 U/L      AST (SGOT) 11 U/L      Alkaline Phosphatase 61 U/L      Total Bilirubin 0.6 mg/dL      eGFR Non African Amer 123 mL/min/1.73      Globulin 2.9 gm/dL      A/G Ratio 1.5 g/dL      BUN/Creatinine Ratio 17.9     Anion Gap 8.8 mmol/L     Lipid Panel [[id number]]  (Abnormal) Collected:  12/06/17 0708    Specimen:  Blood Updated:  12/06/17 0814     Total Cholesterol 165 mg/dL      Triglycerides 131 mg/dL      HDL Cholesterol 38 (L) mg/dL      LDL Cholesterol  101 (H) mg/dL      VLDL Cholesterol 26.2 mg/dL      LDL/HDL Ratio 2.65    Narrative:       Cholesterol Reference  Ranges  (U.S. Department of Health and Human Services ATP III Classifications)    Desirable          <200 mg/dL  Borderline High    200-239 mg/dL  High Risk          >240 mg/dL      Triglyceride Reference Ranges  (U.S. Department of Health and Human Services ATP III Classifications)    Normal           <150 mg/dL  Borderline High  150-199 mg/dL  High             200-499 mg/dL  Very High        >500 mg/dL    HDL Reference Ranges  (U.S. Department of Health and Human Services ATP III Classifcations)    Low     <40 mg/dl (major risk factor for CHD)  High    >60 mg/dl ('negative' risk factor for CHD)        LDL Reference Ranges  (U.S. Department of Health and Human Services ATP III Classifcations)    Optimal          <100 mg/dL  Near Optimal     100-129 mg/dL  Borderline High  130-159 mg/dL  High             160-189 mg/dL  Very High        >189 mg/dL    TSH [[id number]]  (Normal) Collected:  12/06/17 0708    Specimen:  Blood Updated:  12/06/17 0825     TSH 2.630 mIU/mL     BNP [[id number]]  (Abnormal) Collected:  12/06/17 0708    Specimen:  Blood Updated:  12/06/17 0836     proBNP <5.0 (L) pg/mL     Narrative:       Among patients with dyspnea, NT-proBNP is highly sensitive for the detection of acute congestive heart failure. In addition NT-proBNP of <300 pg/ml effectively rules out acute congestive heart failure with 99% negative predictive value.        Imaging Results (last 24 hours)     ** No results found for the last 24 hours. **          Current Facility-Administered Medications:   •  calcium polycarbophil (FIBERCON) tablet 625 mg, 625 mg, Oral, Daily, Artur Brito MD, 625 mg at 12/06/17 0803  •  lisinopril (PRINIVIL,ZESTRIL) tablet 20 mg, 20 mg, Oral, Q24H, Artur Brito MD, 20 mg at 12/06/17 0803  •  pantoprazole (PROTONIX) EC tablet 40 mg, 40 mg, Oral, Q AM, Artur Brito MD  •  rivaroxaban (XARELTO) tablet 15 mg, 15 mg, Oral, BID With Meals **FOLLOWED BY** [START ON 12/27/2017] rivaroxaban (XARELTO) tablet 20 mg,  20 mg, Oral, Daily With Dinner, Mine Brito MD     ASSESSMENT  Right lower extremity DVT  Right hamstring tear Nonsurgical treatment  Hypertension  Hyperlipidemia    PLAN  CPM  Xarelto  Orthopedic surgery consult appreciated  Continue home medications  Stress ulcer prophylaxis  Pain management  Discharge in AM    MINE BRITO MD

## 2017-12-07 VITALS
HEART RATE: 83 BPM | DIASTOLIC BLOOD PRESSURE: 82 MMHG | WEIGHT: 215 LBS | BODY MASS INDEX: 33.74 KG/M2 | HEIGHT: 67 IN | OXYGEN SATURATION: 96 % | SYSTOLIC BLOOD PRESSURE: 115 MMHG | TEMPERATURE: 98.8 F | RESPIRATION RATE: 18 BRPM

## 2017-12-07 LAB
BASOPHILS # BLD AUTO: 0.04 10*3/MM3 (ref 0–0.2)
BASOPHILS NFR BLD AUTO: 0.5 % (ref 0–1.5)
DEPRECATED RDW RBC AUTO: 41 FL (ref 37–54)
EOSINOPHIL # BLD AUTO: 0.51 10*3/MM3 (ref 0–0.7)
EOSINOPHIL NFR BLD AUTO: 6.2 % (ref 0.3–6.2)
ERYTHROCYTE [DISTWIDTH] IN BLOOD BY AUTOMATED COUNT: 12.2 % (ref 11.5–14.5)
HCT VFR BLD AUTO: 42.1 % (ref 40.4–52.2)
HGB BLD-MCNC: 14 G/DL (ref 13.7–17.6)
IMM GRANULOCYTES # BLD: 0.02 10*3/MM3 (ref 0–0.03)
IMM GRANULOCYTES NFR BLD: 0.2 % (ref 0–0.5)
LYMPHOCYTES # BLD AUTO: 1.95 10*3/MM3 (ref 0.9–4.8)
LYMPHOCYTES NFR BLD AUTO: 23.6 % (ref 19.6–45.3)
MCH RBC QN AUTO: 31 PG (ref 27–32.7)
MCHC RBC AUTO-ENTMCNC: 33.3 G/DL (ref 32.6–36.4)
MCV RBC AUTO: 93.3 FL (ref 79.8–96.2)
MONOCYTES # BLD AUTO: 0.82 10*3/MM3 (ref 0.2–1.2)
MONOCYTES NFR BLD AUTO: 9.9 % (ref 5–12)
NEUTROPHILS # BLD AUTO: 4.91 10*3/MM3 (ref 1.9–8.1)
NEUTROPHILS NFR BLD AUTO: 59.6 % (ref 42.7–76)
PLATELET # BLD AUTO: 184 10*3/MM3 (ref 140–500)
PMV BLD AUTO: 10.6 FL (ref 6–12)
RBC # BLD AUTO: 4.51 10*6/MM3 (ref 4.6–6)
WBC NRBC COR # BLD: 8.25 10*3/MM3 (ref 4.5–10.7)

## 2017-12-07 PROCEDURE — 85025 COMPLETE CBC W/AUTO DIFF WBC: CPT | Performed by: HOSPITALIST

## 2017-12-07 PROCEDURE — G0378 HOSPITAL OBSERVATION PER HR: HCPCS

## 2017-12-07 RX ORDER — PANTOPRAZOLE SODIUM 40 MG/1
40 TABLET, DELAYED RELEASE ORAL DAILY
Qty: 30 TABLET | Refills: 0 | Status: SHIPPED | OUTPATIENT
Start: 2017-12-07 | End: 2018-01-06

## 2017-12-07 RX ORDER — CALCIUM POLYCARBOPHIL 625 MG 625 MG/1
625 TABLET ORAL DAILY
Qty: 30 TABLET | Refills: 0 | Status: SHIPPED | OUTPATIENT
Start: 2017-12-08 | End: 2018-01-07

## 2017-12-07 RX ORDER — LISINOPRIL 20 MG/1
20 TABLET ORAL
Qty: 30 TABLET | Refills: 0 | Status: SHIPPED | OUTPATIENT
Start: 2017-12-08 | End: 2018-01-07

## 2017-12-07 RX ADMIN — LISINOPRIL 20 MG: 20 TABLET ORAL at 08:41

## 2017-12-07 RX ADMIN — RIVAROXABAN 15 MG: 15 TABLET, FILM COATED ORAL at 08:41

## 2017-12-07 RX ADMIN — CALCIUM POLYCARBOPHIL 625 MG: 625 TABLET, FILM COATED ORAL at 08:41

## 2017-12-07 NOTE — PLAN OF CARE
Problem: Patient Care Overview (Adult)  Goal: Plan of Care Review  Outcome: Ongoing (interventions implemented as appropriate)    12/07/17 0535   Coping/Psychosocial Response Interventions   Plan Of Care Reviewed With patient;spouse   Patient Care Overview   Progress improving   Outcome Evaluation   Outcome Summary/Follow up Plan pt is A&Ox4, reports no pain, scd on lf leg, monitor edema, vitals WNL, wife at bedside, room air       Goal: Adult Individualization and Mutuality  Outcome: Ongoing (interventions implemented as appropriate)    Problem: Fall Risk (Adult)  Goal: Identify Related Risk Factors and Signs and Symptoms  Outcome: Ongoing (interventions implemented as appropriate)  Goal: Absence of Falls  Outcome: Ongoing (interventions implemented as appropriate)    Problem: Pain, Acute (Adult)  Goal: Identify Related Risk Factors and Signs and Symptoms  Outcome: Ongoing (interventions implemented as appropriate)  Goal: Acceptable Pain Control/Comfort Level  Outcome: Ongoing (interventions implemented as appropriate)

## 2017-12-07 NOTE — PROGRESS NOTES
Continued Stay Note  [facility]     Patient Name: Henry Campos  MRN: [id number]  Today's Date: 12/7/2017    Admit Date: 12/4/2017          Discharge Plan       12/07/17 1512    Final Note    Final Note Plan home with wife.   PO [place]              Discharge Codes       12/07/17 1513    Discharge Codes    Discharge Codes 01  Discharge to home        Expected Discharge Date and Time     Expected Discharge Date Expected Discharge Time    Dec 7, 2017             Suzie Valdez RN

## 2017-12-07 NOTE — PROGRESS NOTES
"Daily progress note    Chief complaint  Doing better  No new complaints    History of present illness  Pt is a 57 y.o. male who presents with R calf pain and swelling onset a few days ago. Pt reports he was sleeping on a tarp a week ago and heard something \"tear.\" Pt reports he waited 4-5 days after sustaining the injury to his R leg to go see Dr. Yepez (orthopedist). The day before he saw his orthopedist, he developed pain and swelling to his R calf. When he saw Dr. Yepez, he was informed he tore his hamstring. Pt also reports that when he was seen by Dr. Yepez, they recommended he have an US to rule out a DVT. He reports going to [facility] for an US today who then told him he had a DVT. PT denies any current CP or SOA.  Patient denies any chest pain shortness of breath abdominal pain nausea vomiting diarrhea.  Patient still hurting at the right calf at the time of interview she also denies any fevers chills night sweats or weight loss     REVIEW OF SYSTEMS  Review of Systems   Constitutional: Negative for activity change, appetite change and fever.   HENT: Negative for congestion and sore throat.    Eyes: Negative.    Respiratory: Negative for cough and shortness of breath.    Cardiovascular: Negative for chest pain.   Gastrointestinal: Negative for abdominal pain, diarrhea and vomiting.   Endocrine: Negative.    Genitourinary: Negative for decreased urine volume and dysuria.   Musculoskeletal: Positive for myalgias (R calf). Negative for neck pain.   Skin: Negative for rash and wound.   Allergic/Immunologic: Negative.    Neurological: Negative for weakness, numbness and headaches.   Hematological: Negative.    Psychiatric/Behavioral: Negative.    All other systems reviewed and are negative.     PHYSICAL EXAM  Blood pressure 115/82, pulse 83, temperature 98.8 °F (37.1 °C), temperature source Oral, resp. rate 18, height 170.2 cm (67\"), weight 97.5 kg (215 lb), SpO2 96 %.    Constitutional: He is " oriented to person, place, and time and well-developed, well-nourished, and in no distress. No distress.  Head: Normocephalic and atraumatic.   Mouth/Throat: Oropharynx is clear and moist.   Eyes: EOM are normal. Pupils are equal, round, and reactive to light.   Neck: Normal range of motion. Neck supple.   Cardiovascular: Normal rate, regular rhythm and normal heart sounds.    Pulses:       Dorsalis pedis pulses are 2+ on the right side, and 2+ on the left side.        Posterior tibial pulses are 2+ on the right side, and 2+ on the left side.   Pulmonary/Chest: Effort normal and breath sounds normal. No respiratory distress. He has no wheezes. He exhibits no tenderness.   Abdominal: Soft. He exhibits no distension. There is no tenderness. There is no rebound and no guarding.   Musculoskeletal:        Right lower leg: He exhibits tenderness (medial calf).   Lymphadenopathy:     He has no cervical adenopathy.   Neurological: He is alert and oriented to person, place, and time.   Skin: Skin is warm and dry. No rash noted. No pallor.   There is no obvious swelling, erythema or warmth to the medial calf. There is yellow to purple bruising on the R distal medial thigh that tracks up through his thigh.    Psychiatric: Mood, memory, affect and judgment normal.     LAB RESULTS  Lab Results (last 24 hours)     Procedure Component Value Units Date/Time    CBC & Differential [[id number]] Collected:  12/07/17 0544    Specimen:  Blood Updated:  12/07/17 0629    Narrative:       The following orders were created for panel order CBC & Differential.  Procedure                               Abnormality         Status                     ---------                               -----------         ------                     CBC Auto Differential[[id number]]        Abnormal            Final result                 Please view results for these tests on the individual orders.    CBC Auto Differential [[id number]]  (Abnormal) Collected:   12/07/17 0544    Specimen:  Blood Updated:  12/07/17 0629     WBC 8.25 10*3/mm3      RBC 4.51 (L) 10*6/mm3      Hemoglobin 14.0 g/dL      Hematocrit 42.1 %      MCV 93.3 fL      MCH 31.0 pg      MCHC 33.3 g/dL      RDW 12.2 %      RDW-SD 41.0 fl      MPV 10.6 fL      Platelets 184 10*3/mm3      Neutrophil % 59.6 %      Lymphocyte % 23.6 %      Monocyte % 9.9 %      Eosinophil % 6.2 %      Basophil % 0.5 %      Immature Grans % 0.2 %      Neutrophils, Absolute 4.91 10*3/mm3      Lymphocytes, Absolute 1.95 10*3/mm3      Monocytes, Absolute 0.82 10*3/mm3      Eosinophils, Absolute 0.51 10*3/mm3      Basophils, Absolute 0.04 10*3/mm3      Immature Grans, Absolute 0.02 10*3/mm3         Imaging Results (last 24 hours)     ** No results found for the last 24 hours. **          Current Facility-Administered Medications:   •  calcium polycarbophil (FIBERCON) tablet 625 mg, 625 mg, Oral, Daily, Mine Brito MD, 625 mg at 12/07/17 0841  •  lisinopril (PRINIVIL,ZESTRIL) tablet 20 mg, 20 mg, Oral, Q24H, Mine Brito MD, 20 mg at 12/07/17 0841  •  pantoprazole (PROTONIX) EC tablet 40 mg, 40 mg, Oral, Q AM, Mine Brito MD  •  rivaroxaban (XARELTO) tablet 15 mg, 15 mg, Oral, BID With Meals, 15 mg at 12/07/17 0841 **FOLLOWED BY** [START ON 12/27/2017] rivaroxaban (XARELTO) tablet 20 mg, 20 mg, Oral, Daily With Dinner, Mine Brito MD     ASSESSMENT  Right lower extremity DVT  Right hamstring tear Nonsurgical treatment  Hypertension  Hyperlipidemia    PLAN  Discharge home  Discharge summary dictated    MINE BRITO MD

## 2017-12-07 NOTE — DISCHARGE SUMMARY
Discharge summary    Date of admission 12/4/2017  Date of discharge 12/7/2017    Final diagnosis  Right lower extremity DVT  Right hamstring tear Nonsurgical treatment  Hypertension  Hyperlipidemia    Discharge medications    Current Facility-Administered Medications:   •  calcium polycarbophil (FIBERCON) tablet 625 mg, 625 mg, Oral, Daily, Artur Brito MD, 625 mg at 12/07/17 0841  •  lisinopril (PRINIVIL,ZESTRIL) tablet 20 mg, 20 mg, Oral, Q24H, Artur Brito MD, 20 mg at 12/07/17 0841  •  pantoprazole (PROTONIX) EC tablet 40 mg, 40 mg, Oral, Q AM, Artur Brito MD  •  rivaroxaban (XARELTO) tablet 15 mg, 15 mg, Oral, BID With Meals, 15 mg at 12/07/17 0841 **FOLLOWED BY** [START ON 12/27/2017] rivaroxaban (XARELTO) tablet 20 mg, 20 mg, Oral, Daily With Dinner, Artur Brito MD     Consult obtained  Orthopedic surgery    Procedures  None     Hospital course  67-year-old white male with no significant past medical history except for hypertension hyperlipidemia who sustained the injury to right leg and was followed by Dr. Goodman for right hamstring muscle tear present it to [facility] with right calf pain and swelling and was evaluated with Doppler study as an outpatient which showed extensive DVT admitted for management.  Patient has no shortness of breath or fever or any other complaint.  Patient denies any chest pain also.  Patient started on heparin and orthopedic surgery consult obtained.  He needs to go for surgery orthopedic recommended nonsurgical treatment for his right hamstring muscle.  Patient started on Xarelto which she tolerated very well swelling tenderness pain and redness all cause.  He was taking aspirin which is stopped.  He'll be discharged home in stable condition.  Continue Xarelto for at least 3-6 months needs to be further evaluated by primary care doctor and he will also follow-up with orthopedic surgery.      Discharge diet regular    Activity as tolerated    Discharge medication  as above    Follow-up with primary doctor in 1 week follow-up with orthopedic surgery per their recommendation and take medications as directed    MINE MCKEON MD